# Patient Record
Sex: MALE | Race: OTHER | NOT HISPANIC OR LATINO | Employment: OTHER | URBAN - METROPOLITAN AREA
[De-identification: names, ages, dates, MRNs, and addresses within clinical notes are randomized per-mention and may not be internally consistent; named-entity substitution may affect disease eponyms.]

---

## 2017-01-26 ENCOUNTER — OFFICE VISIT (OUTPATIENT)
Dept: OPTOMETRY | Facility: CLINIC | Age: 62
End: 2017-01-26
Payer: COMMERCIAL

## 2017-01-26 DIAGNOSIS — H43.811 POSTERIOR VITREOUS DETACHMENT, RIGHT: Primary | ICD-10-CM

## 2017-01-26 PROCEDURE — 99999 PR PBB SHADOW E&M-EST. PATIENT-LVL II: CPT | Mod: PBBFAC,,, | Performed by: OPTOMETRIST

## 2017-01-26 PROCEDURE — 92014 COMPRE OPH EXAM EST PT 1/>: CPT | Mod: S$GLB,,, | Performed by: OPTOMETRIST

## 2017-01-26 NOTE — PROGRESS NOTES
HPI     Eye Problem    Additional comments: Flashes od           Comments   62 year old male   Complains of light flashes in the peripheral of his right eye for 2 days.   States that they come and go   Denies any floaters or total loss of vision   No eye pain         Last edited by Harvey Gayle, OD on 1/26/2017  9:02 AM. (History)        ROS     Negative for: Constitutional, Gastrointestinal, Neurological, Skin,   Genitourinary, Musculoskeletal, HENT, Endocrine, Cardiovascular, Eyes,   Respiratory, Psychiatric, Allergic/Imm, Heme/Lymph    Last edited by Harvey Gayle, OD on 1/26/2017  9:02 AM. (History)        Assessment /Plan     For exam results, see Encounter Report.    Posterior vitreous detachment, right      1. No evidence of holes, tears or detachment of retina. Negative Shafers sign in vitreous. 90 diopter lens exam negative in all quadrants. Patient educated to signs and symptoms of retinal detachment and return to clinic immediately if signs or symptoms arise. RTC 1 month dilated eye exam.

## 2017-02-23 ENCOUNTER — OFFICE VISIT (OUTPATIENT)
Dept: OPTOMETRY | Facility: CLINIC | Age: 62
End: 2017-02-23
Payer: COMMERCIAL

## 2017-02-23 DIAGNOSIS — H43.811 POSTERIOR VITREOUS DETACHMENT, RIGHT: Primary | ICD-10-CM

## 2017-02-23 PROCEDURE — 99999 PR PBB SHADOW E&M-EST. PATIENT-LVL II: CPT | Mod: PBBFAC,,, | Performed by: OPTOMETRIST

## 2017-02-23 PROCEDURE — 92014 COMPRE OPH EXAM EST PT 1/>: CPT | Mod: S$GLB,,, | Performed by: OPTOMETRIST

## 2017-02-23 NOTE — PROGRESS NOTES
HPI     Spots and/or Floaters    Additional comments: PVD Check right eye           Comments   Patient here for 1 month PVD check OD, states flashes do not occur as   often as they did at initial presentation. No changes in vision and no   increase in floaters.     (-) Pain, discomfort   (+) Flashes (Decreased)   (+) Floaters stable        Last edited by Harvey Gayle, OD on 2/23/2017  9:09 AM. (History)        ROS     Negative for: Constitutional, Gastrointestinal, Neurological, Skin,   Genitourinary, Musculoskeletal, HENT, Endocrine, Cardiovascular, Eyes,   Respiratory, Psychiatric, Allergic/Imm, Heme/Lymph    Last edited by Harvey Gayle, OD on 2/23/2017  9:06 AM. (History)        Assessment /Plan     For exam results, see Encounter Report.    Posterior vitreous detachment, right      1. No evidence of holes, tears or detachment of retina. Negative Shafers sign in vitreous. 90 diopter lens exam negative in all quadrants. Patient educated to signs and symptoms of retinal detachment and return to clinic immediately if signs or symptoms arise.

## 2017-04-27 ENCOUNTER — OFFICE VISIT (OUTPATIENT)
Dept: OPTOMETRY | Facility: CLINIC | Age: 62
End: 2017-04-27
Payer: COMMERCIAL

## 2017-04-27 DIAGNOSIS — H52.203 MYOPIA WITH ASTIGMATISM AND PRESBYOPIA, BILATERAL: ICD-10-CM

## 2017-04-27 DIAGNOSIS — H43.811 POSTERIOR VITREOUS DETACHMENT, RIGHT: Primary | ICD-10-CM

## 2017-04-27 DIAGNOSIS — H52.4 MYOPIA WITH ASTIGMATISM AND PRESBYOPIA, BILATERAL: ICD-10-CM

## 2017-04-27 DIAGNOSIS — H52.13 MYOPIA WITH ASTIGMATISM AND PRESBYOPIA, BILATERAL: ICD-10-CM

## 2017-04-27 PROCEDURE — 92012 INTRM OPH EXAM EST PATIENT: CPT | Mod: S$GLB,,, | Performed by: OPTOMETRIST

## 2017-04-27 PROCEDURE — 92015 DETERMINE REFRACTIVE STATE: CPT | Mod: S$GLB,,, | Performed by: OPTOMETRIST

## 2017-04-27 PROCEDURE — 99999 PR PBB SHADOW E&M-EST. PATIENT-LVL II: CPT | Mod: PBBFAC,,, | Performed by: OPTOMETRIST

## 2017-04-27 NOTE — PROGRESS NOTES
HPI     Spots and/or Floaters    Additional comments: OD           Comments   Flashes have decreased to couple times per week   Pt prefers not be dilated if possible.  No vision changes  Wants glasses check       Last edited by Harvey Gayle, OD on 4/27/2017  8:41 AM. (History)        ROS     Negative for: Constitutional, Gastrointestinal, Neurological, Skin,   Genitourinary, Musculoskeletal, HENT, Endocrine, Cardiovascular, Eyes,   Respiratory, Psychiatric, Allergic/Imm, Heme/Lymph    Last edited by Harvey Gayle, OD on 4/27/2017  8:41 AM. (History)        Assessment /Plan     For exam results, see Encounter Report.    Posterior vitreous detachment, right    Myopia with astigmatism and presbyopia, bilateral      1. Symptoms less, No evidence of holes, tears or detachment of retina. Negative Shafers sign in vitreous. 90 diopter lens exam negative in all quadrants. Patient educated to signs and symptoms of retinal detachment and return to clinic immediately if signs or symptoms arise. Pt chose to defer dilation.   2. Spec Rx given. Different lens options discussed with patient. RTC 1 year full exam.

## 2017-05-05 RX ORDER — PRAVASTATIN SODIUM 20 MG/1
TABLET ORAL
Qty: 12 TABLET | Refills: 2 | Status: SHIPPED | OUTPATIENT
Start: 2017-05-05 | End: 2017-06-09 | Stop reason: DRUGHIGH

## 2017-06-03 ENCOUNTER — PATIENT MESSAGE (OUTPATIENT)
Dept: INTERNAL MEDICINE | Facility: CLINIC | Age: 62
End: 2017-06-03

## 2017-06-03 DIAGNOSIS — Z00.00 ROUTINE GENERAL MEDICAL EXAMINATION AT A HEALTH CARE FACILITY: Primary | ICD-10-CM

## 2017-06-03 DIAGNOSIS — Z12.5 SCREENING PSA (PROSTATE SPECIFIC ANTIGEN): ICD-10-CM

## 2017-06-07 ENCOUNTER — LAB VISIT (OUTPATIENT)
Dept: LAB | Facility: HOSPITAL | Age: 62
End: 2017-06-07
Attending: INTERNAL MEDICINE
Payer: COMMERCIAL

## 2017-06-07 DIAGNOSIS — Z11.59 NEED FOR HEPATITIS C SCREENING TEST: ICD-10-CM

## 2017-06-07 DIAGNOSIS — Z00.00 ROUTINE GENERAL MEDICAL EXAMINATION AT A HEALTH CARE FACILITY: ICD-10-CM

## 2017-06-07 DIAGNOSIS — Z12.5 SCREENING PSA (PROSTATE SPECIFIC ANTIGEN): ICD-10-CM

## 2017-06-07 LAB
ALBUMIN SERPL BCP-MCNC: 3.8 G/DL
ALP SERPL-CCNC: 73 U/L
ALT SERPL W/O P-5'-P-CCNC: 35 U/L
ANION GAP SERPL CALC-SCNC: 9 MMOL/L
AST SERPL-CCNC: 23 U/L
BASOPHILS # BLD AUTO: 0.05 K/UL
BASOPHILS NFR BLD: 0.6 %
BILIRUB SERPL-MCNC: 0.4 MG/DL
BUN SERPL-MCNC: 14 MG/DL
CALCIUM SERPL-MCNC: 9.3 MG/DL
CHLORIDE SERPL-SCNC: 109 MMOL/L
CHOLEST/HDLC SERPL: 4.6 {RATIO}
CO2 SERPL-SCNC: 22 MMOL/L
COMPLEXED PSA SERPL-MCNC: 0.24 NG/ML
CREAT SERPL-MCNC: 0.9 MG/DL
DIFFERENTIAL METHOD: ABNORMAL
EOSINOPHIL # BLD AUTO: 0.3 K/UL
EOSINOPHIL NFR BLD: 3.6 %
ERYTHROCYTE [DISTWIDTH] IN BLOOD BY AUTOMATED COUNT: 13.9 %
EST. GFR  (AFRICAN AMERICAN): >60 ML/MIN/1.73 M^2
EST. GFR  (NON AFRICAN AMERICAN): >60 ML/MIN/1.73 M^2
GLUCOSE SERPL-MCNC: 105 MG/DL
HCT VFR BLD AUTO: 45.3 %
HCV AB SERPL QL IA: NEGATIVE
HDL/CHOLESTEROL RATIO: 21.8 %
HDLC SERPL-MCNC: 179 MG/DL
HDLC SERPL-MCNC: 39 MG/DL
HGB BLD-MCNC: 15.3 G/DL
LDLC SERPL CALC-MCNC: 118.8 MG/DL
LYMPHOCYTES # BLD AUTO: 2.3 K/UL
LYMPHOCYTES NFR BLD: 29 %
MCH RBC QN AUTO: 29.9 PG
MCHC RBC AUTO-ENTMCNC: 33.8 %
MCV RBC AUTO: 89 FL
MONOCYTES # BLD AUTO: 0.7 K/UL
MONOCYTES NFR BLD: 9.2 %
NEUTROPHILS # BLD AUTO: 4.5 K/UL
NEUTROPHILS NFR BLD: 57.5 %
NONHDLC SERPL-MCNC: 140 MG/DL
PLATELET # BLD AUTO: 292 K/UL
PMV BLD AUTO: 9 FL
POTASSIUM SERPL-SCNC: 4.6 MMOL/L
PROT SERPL-MCNC: 6.8 G/DL
RBC # BLD AUTO: 5.12 M/UL
SODIUM SERPL-SCNC: 140 MMOL/L
TRIGL SERPL-MCNC: 106 MG/DL
TSH SERPL DL<=0.005 MIU/L-ACNC: 2.1 UIU/ML
WBC # BLD AUTO: 7.83 K/UL

## 2017-06-07 PROCEDURE — 80061 LIPID PANEL: CPT

## 2017-06-07 PROCEDURE — 80053 COMPREHEN METABOLIC PANEL: CPT

## 2017-06-07 PROCEDURE — 36415 COLL VENOUS BLD VENIPUNCTURE: CPT | Mod: PO

## 2017-06-07 PROCEDURE — 84443 ASSAY THYROID STIM HORMONE: CPT

## 2017-06-07 PROCEDURE — 84153 ASSAY OF PSA TOTAL: CPT

## 2017-06-07 PROCEDURE — 85025 COMPLETE CBC W/AUTO DIFF WBC: CPT

## 2017-06-07 PROCEDURE — 86803 HEPATITIS C AB TEST: CPT

## 2017-06-09 ENCOUNTER — OFFICE VISIT (OUTPATIENT)
Dept: INTERNAL MEDICINE | Facility: CLINIC | Age: 62
End: 2017-06-09
Payer: COMMERCIAL

## 2017-06-09 VITALS
TEMPERATURE: 99 F | DIASTOLIC BLOOD PRESSURE: 64 MMHG | SYSTOLIC BLOOD PRESSURE: 100 MMHG | BODY MASS INDEX: 25.16 KG/M2 | WEIGHT: 189.81 LBS | HEART RATE: 68 BPM | HEIGHT: 73 IN

## 2017-06-09 DIAGNOSIS — Z00.00 ENCOUNTER FOR PREVENTIVE HEALTH EXAMINATION: Primary | ICD-10-CM

## 2017-06-09 DIAGNOSIS — E78.5 DYSLIPIDEMIA: ICD-10-CM

## 2017-06-09 PROCEDURE — 99999 PR PBB SHADOW E&M-EST. PATIENT-LVL III: CPT | Mod: PBBFAC,,, | Performed by: INTERNAL MEDICINE

## 2017-06-09 PROCEDURE — 99396 PREV VISIT EST AGE 40-64: CPT | Mod: S$GLB,,, | Performed by: INTERNAL MEDICINE

## 2017-06-09 RX ORDER — PRAVASTATIN SODIUM 20 MG/1
20 TABLET ORAL NIGHTLY
Qty: 90 TABLET | Refills: 3 | Status: SHIPPED | OUTPATIENT
Start: 2017-06-09 | End: 2017-12-14

## 2017-06-09 NOTE — PROGRESS NOTES
History of present illness:  62-year-old male in today for general health assessment.    Current medication:  Pravastatin 20 mg on Monday was a Friday.    Review of systems:  General: no fever, chills, generalized body aches. No unexpected weight loss.  Eyes:  No visual disturbances.  HEENT:  No hoarseness, dysphagia, ear pain.  Respiratory:  No cough, no shortness of breath.  Cardiovascular: no chest pain, palpitations, cough, exertional limb pain. No edema.  GI: no nausea, vomiting.  No abdominal pain. No change in bowel habits.  No melena, no hematochezia.  : no dysuria. No change in the color or character of the urine. No urinary frequency.  Musculoskeletal: He has seen an outside orthopedics for knee osteoarthritis.  This is stable at present.  Neurologic:  No focal neurological complaints.  No headaches.  Skin:  No rashes or other concerns.  Psych:  No emotional issues    Past medical history:  Dyslipidemia  Obstructive sleep apnea not intolerant to CPAP.    Past surgical history, family medical history, and social histories are noted and reviewed in the electronic medical record history sections.    Health screenings:  He has not yet had a colonoscopy.  He is receptive to such and will contact us when he is ready to proceed.  Eye exams are up-to-date.    Physical examination:  GENERAL:  Alert, appropriately groomed, no acute distress.  VS:  EYES: sclerae white ,nonicteric. PERRL. some ptosis of the left lid, chronic issue  HEENT:  Normocephalic. Ear canals and tympanic membranes normal. Mouth and pharynx normal. No thyromegaly. Trachea midline and freely mobile.  LUNGS:  Clear to ascultation and normal to percussion.  CARDIOVASCULAR:  Normal heart sounds.  No significant murmur. Carotids full bilaterally without bruit.  Pedal pulses intact .  No abdominal bruit.  No peripheral extremity edema.  GI: the abdomen is soft, no distension. No masses , tenderness, organomegaly.  Rectal examination normal.  :  scrotum, testicles and penis normal. Prostate without nodules or asymmetry.  LYMPHATIC:  No axillary, inguinal , cervical adenopathy.  MUSCULOSKELETAL:  Range of motion, stability and strength of the right and left upper and lower extremities normal. No swollen or tender joints  NEUROLOGIC:  DTR's normal. No gross motor or sensory deficits apparent, gait normal.  SKIN:  No rashes.   MS:  Alert, oriented , affect and mood all appropriate    Data:  Lab data noted and reviewed from June 7, 2017.  All reasonable.  Lipids are probably not quite optimal.    Impression:  Generally healthy 62-year-old male living healthy lifestyle with some recent improvement with regards to diet.  Dyslipidemia on low-dose statin therapy at least a candidate for moderate to high intensity statin therapy.  There is a question of this some joint issues with moderate dose pravastatin in the past but it is not clear.  Structure sleep apnea intolerant to CPAP.    Plan:  Discussed trying to titrate upward on his statin therapy and he will begin taking pravastatin 20 mg daily and observe.  Chemistry profile lipid profile in 3 months.  He will call us when he is ready to schedule colonoscopy.

## 2017-09-08 ENCOUNTER — LAB VISIT (OUTPATIENT)
Dept: LAB | Facility: HOSPITAL | Age: 62
End: 2017-09-08
Attending: INTERNAL MEDICINE
Payer: COMMERCIAL

## 2017-09-08 DIAGNOSIS — E78.5 DYSLIPIDEMIA: ICD-10-CM

## 2017-09-08 LAB
ALBUMIN SERPL BCP-MCNC: 3.6 G/DL
ALP SERPL-CCNC: 87 U/L
ALT SERPL W/O P-5'-P-CCNC: 30 U/L
ANION GAP SERPL CALC-SCNC: 9 MMOL/L
AST SERPL-CCNC: 16 U/L
BILIRUB SERPL-MCNC: 0.3 MG/DL
BUN SERPL-MCNC: 19 MG/DL
CALCIUM SERPL-MCNC: 9.2 MG/DL
CHLORIDE SERPL-SCNC: 106 MMOL/L
CHOLEST SERPL-MCNC: 162 MG/DL
CHOLEST/HDLC SERPL: 3.5 {RATIO}
CO2 SERPL-SCNC: 24 MMOL/L
CREAT SERPL-MCNC: 0.5 MG/DL
EST. GFR  (AFRICAN AMERICAN): >60 ML/MIN/1.73 M^2
EST. GFR  (NON AFRICAN AMERICAN): >60 ML/MIN/1.73 M^2
GLUCOSE SERPL-MCNC: 102 MG/DL
HDLC SERPL-MCNC: 46 MG/DL
HDLC SERPL: 28.4 %
LDLC SERPL CALC-MCNC: 102.2 MG/DL
NONHDLC SERPL-MCNC: 116 MG/DL
POTASSIUM SERPL-SCNC: 4.5 MMOL/L
PROT SERPL-MCNC: 7 G/DL
SODIUM SERPL-SCNC: 139 MMOL/L
TRIGL SERPL-MCNC: 69 MG/DL

## 2017-09-08 PROCEDURE — 36415 COLL VENOUS BLD VENIPUNCTURE: CPT | Mod: PO

## 2017-09-08 PROCEDURE — 80061 LIPID PANEL: CPT

## 2017-09-08 PROCEDURE — 80053 COMPREHEN METABOLIC PANEL: CPT

## 2017-10-17 ENCOUNTER — HOSPITAL ENCOUNTER (OUTPATIENT)
Dept: RADIOLOGY | Facility: HOSPITAL | Age: 62
Discharge: HOME | End: 2017-10-17
Attending: INTERNAL MEDICINE
Payer: COMMERCIAL

## 2017-10-17 ENCOUNTER — OFFICE VISIT (OUTPATIENT)
Dept: INTERNAL MEDICINE | Facility: CLINIC | Age: 62
End: 2017-10-17
Payer: COMMERCIAL

## 2017-10-17 VITALS
RESPIRATION RATE: 20 BRPM | SYSTOLIC BLOOD PRESSURE: 138 MMHG | HEART RATE: 70 BPM | TEMPERATURE: 99 F | DIASTOLIC BLOOD PRESSURE: 82 MMHG | BODY MASS INDEX: 24.63 KG/M2 | HEIGHT: 73 IN | WEIGHT: 185.88 LBS

## 2017-10-17 DIAGNOSIS — Z12.11 COLON CANCER SCREENING: ICD-10-CM

## 2017-10-17 DIAGNOSIS — R10.11 ABDOMINAL PAIN, RUQ: Primary | ICD-10-CM

## 2017-10-17 DIAGNOSIS — R10.11 ABDOMINAL PAIN, RUQ: ICD-10-CM

## 2017-10-17 PROCEDURE — 99999 PR PBB SHADOW E&M-EST. PATIENT-LVL III: CPT | Mod: PBBFAC,,, | Performed by: INTERNAL MEDICINE

## 2017-10-17 PROCEDURE — 71020 XR CHEST PA AND LATERAL: CPT | Mod: TC,PO

## 2017-10-17 PROCEDURE — 99214 OFFICE O/P EST MOD 30 MIN: CPT | Mod: S$GLB,,, | Performed by: INTERNAL MEDICINE

## 2017-10-17 PROCEDURE — 71020 XR CHEST PA AND LATERAL: CPT | Mod: 26,,, | Performed by: RADIOLOGY

## 2017-10-17 NOTE — PROGRESS NOTES
This office note has been dictated.  HISTORY OF PRESENT ILLNESS:  This is a 62-year-old gentleman in today with a   complaint of about a month of some right upper quadrant discomfort, quite   ill-defined, mild, tends to happen mostly in the evening, does seem to be   occurring after meals, but it is not associated with any nausea, vomiting, GERD   symptoms or other, not associated with any bowel changes.  No radiation, not   related to movement or physical activity.  He does report playing more tennis   recently than he normally does.  No previous history of similar pain.  No weight   loss or weight gain.    CURRENT MEDICATIONS:  Noted and reviewed in the electronic medical record   medication list.    REVIEW OF SYSTEMS:  CONSTITUTIONAL:  No fever, no chills, no generalized body aches.  CARDIOVASCULAR:  No chest pain, palpitations or syncope.  RESPIRATORY:  No cough or shortness of breath.  GENITOURINARY:  No dysuria, frequency, change in color, or character of urine.  SKIN:  No rashes.    PAST MEDICAL HISTORY, PAST SURGICAL HISTORY, FAMILY AND SOCIAL HISTORY:  All   noted and reviewed in the electronic medical record history sections.    PHYSICAL EXAMINATION:  GENERAL:  Alert, pleasant, appropriately groomed male in no acute distress.  VITAL SIGNS:  All noted and reviewed.  EYES:  Sclerae white, nonicteric.  NECK:  Supple.  No mass, no thyromegaly.  LUNGS:  Clear to auscultation.   CARDIOVASCULAR:  Regular rate and rhythm.  There is no significant murmur.    Carotids are full bilaterally without bruits.  GASTROINTESTINAL:  The abdomen is nondistended, soft, benign.  No masses, no   tenderness, no organomegaly.  There is no CVA tenderness.    IMPRESSION:  Ill-defined intermittent right upper quadrant discomfort, possibly   muscular, rule out other issues.    PLAN:  1.  He is noted to have had a blood work last month, all reasonable including   chemistry profile and urinalysis.  2.  Abdominal ultrasound.  3.  Chest  x-ray.  4.  He is agreeable to go ahead and get the colonoscopy done that is ordered.      PB/IN  dd: 10/17/2017 11:59:53 (CDT)  td: 10/18/2017 04:50:15 (CDT)  Doc ID   #[id number]  Job ID #[id number]    CC:

## 2017-10-19 ENCOUNTER — PATIENT MESSAGE (OUTPATIENT)
Dept: INTERNAL MEDICINE | Facility: CLINIC | Age: 62
End: 2017-10-19

## 2017-10-19 DIAGNOSIS — R93.89 ABNORMAL CXR: Primary | ICD-10-CM

## 2017-10-19 NOTE — TELEPHONE ENCOUNTER
JOYCE message sent to the pt to advise of MD response to xray results and to advise that CT has been scheduled on 10/23/17 to follow his u/s.

## 2017-10-23 ENCOUNTER — HOSPITAL ENCOUNTER (OUTPATIENT)
Dept: RADIOLOGY | Facility: HOSPITAL | Age: 62
Discharge: HOME | End: 2017-10-23
Attending: INTERNAL MEDICINE
Payer: COMMERCIAL

## 2017-10-23 DIAGNOSIS — R10.11 ABDOMINAL PAIN, RUQ: ICD-10-CM

## 2017-10-23 DIAGNOSIS — R93.89 ABNORMAL CXR: ICD-10-CM

## 2017-10-23 PROCEDURE — 76700 US EXAM ABDOM COMPLETE: CPT | Mod: TC

## 2017-10-23 PROCEDURE — 76700 US EXAM ABDOM COMPLETE: CPT | Mod: 26,,, | Performed by: RADIOLOGY

## 2017-10-23 PROCEDURE — 71250 CT THORAX DX C-: CPT | Mod: 26,,, | Performed by: RADIOLOGY

## 2017-10-23 PROCEDURE — 71250 CT THORAX DX C-: CPT | Mod: TC

## 2017-10-24 ENCOUNTER — PATIENT MESSAGE (OUTPATIENT)
Dept: INTERNAL MEDICINE | Facility: CLINIC | Age: 62
End: 2017-10-24

## 2017-10-24 DIAGNOSIS — J84.112 UIP (USUAL INTERSTITIAL PNEUMONITIS): Primary | ICD-10-CM

## 2017-10-25 ENCOUNTER — TELEPHONE (OUTPATIENT)
Dept: INTERNAL MEDICINE | Facility: CLINIC | Age: 62
End: 2017-10-25

## 2017-10-25 NOTE — TELEPHONE ENCOUNTER
Spoke with pt who advises that his results arrived on the pt portal last night and he was able to review the results as well.

## 2017-10-25 NOTE — TELEPHONE ENCOUNTER
----- Message from Sandra West sent at 10/24/2017  4:13 PM CDT -----  Contact: self   Patient would like to get test results.  Name of test (lab, mammo, etc.):  CT-Scan   Date of test:  10/23  Ordering provider: Dr. Aguiar   Where was the test performed:  [facility] CT SCAN  Comments:

## 2017-10-27 ENCOUNTER — HOSPITAL ENCOUNTER (OUTPATIENT)
Dept: PULMONOLOGY | Facility: CLINIC | Age: 62
Discharge: HOME | End: 2017-10-27
Payer: COMMERCIAL

## 2017-10-27 ENCOUNTER — OFFICE VISIT (OUTPATIENT)
Dept: PULMONOLOGY | Facility: CLINIC | Age: 62
End: 2017-10-27
Payer: COMMERCIAL

## 2017-10-27 VITALS
SYSTOLIC BLOOD PRESSURE: 136 MMHG | HEIGHT: 73 IN | DIASTOLIC BLOOD PRESSURE: 72 MMHG | WEIGHT: 189.63 LBS | BODY MASS INDEX: 25.13 KG/M2 | HEART RATE: 66 BPM | OXYGEN SATURATION: 98 %

## 2017-10-27 VITALS — HEIGHT: 71 IN | BODY MASS INDEX: 26.55 KG/M2 | WEIGHT: 189.63 LBS

## 2017-10-27 DIAGNOSIS — R91.8 ABNORMAL CT SCAN OF LUNG: ICD-10-CM

## 2017-10-27 DIAGNOSIS — J43.9 PULMONARY EMPHYSEMA, UNSPECIFIED EMPHYSEMA TYPE: ICD-10-CM

## 2017-10-27 DIAGNOSIS — R91.8 ABNORMAL CT SCAN OF LUNG: Primary | ICD-10-CM

## 2017-10-27 DIAGNOSIS — F17.201 TOBACCO DEPENDENCE IN REMISSION: ICD-10-CM

## 2017-10-27 PROBLEM — R93.89 ABNORMAL CT OF THE CHEST: Status: ACTIVE | Noted: 2017-10-27

## 2017-10-27 LAB
POST FEV1 FVC: 0.68
POST FEV1: 3.43
POST FVC: 5.08
PRE FEV1 FVC: 67
PRE FEV1: 3.41
PRE FVC: 5.07
PREDICTED FEV1 FVC: 79
PREDICTED FEV1: 3.65
PREDICTED FVC: 4.55

## 2017-10-27 PROCEDURE — 99999 PR PBB SHADOW E&M-EST. PATIENT-LVL III: CPT | Mod: PBBFAC,,, | Performed by: HOSPITALIST

## 2017-10-27 PROCEDURE — 94620 PR PULMONARY STRESS TESTING,SIMPLE: CPT | Mod: S$GLB,,, | Performed by: INTERNAL MEDICINE

## 2017-10-27 PROCEDURE — 99203 OFFICE O/P NEW LOW 30 MIN: CPT | Mod: 25,S$GLB,, | Performed by: HOSPITALIST

## 2017-10-27 PROCEDURE — 94060 EVALUATION OF WHEEZING: CPT | Mod: 59,S$GLB,, | Performed by: INTERNAL MEDICINE

## 2017-10-27 PROCEDURE — 94727 GAS DIL/WSHOT DETER LNG VOL: CPT | Mod: 51,S$GLB,, | Performed by: INTERNAL MEDICINE

## 2017-10-27 PROCEDURE — 94729 DIFFUSING CAPACITY: CPT | Mod: S$GLB,,, | Performed by: INTERNAL MEDICINE

## 2017-10-27 NOTE — PROGRESS NOTES
Subjective:       Patient ID: Esteban Carson is a 62 y.o. male.    Chief Complaint: Abnormal Ct Scan and Pulmonary Fibrosis    62-yo male presents on referral from PCP for abnormal CT scan. Pt is generally healthy and exercises 4 days a week. He played two hours of tennis yesterday and credits tennis and healthy diet for a 30-lbs weight loss this year. Last week he had some pain in the right flank of the abdomen and visited his PCP. US abdomen was unremarkable, but CXR was noted to have bibasilar reticular opacities and CT chest was recommended. This was read as showing emphysema, reticular opacities, and honeycombing. Pt was then referred here for further evaluation. His is accompanied by his wife. He says he has no h/o lung or rheumatologic disease and has no respiratory symptoms eg SOB, REED, cough. He does become congested in the sinuses when he mows his lawn, but says this is quickly relieved with Flonase prn. Pt smoked a half-pack of cigarettes daily for about 20 years until his late 30s, then occasionally and intermittently until 2014. Estimated pack years: 10-15. He and his wife have lived near the [place] for 11 years and say the industrial plants there warn of dangerous chemical pollutants, but he is not aware of specific exposures. He denies h/o occupational exposures but says he knows there was asbestos in his childhood home and school and is certain he was exposed. He has no complaints otherwise.       Abnormal Ct Scan   Associated symptoms include abdominal pain (as described above) and congestion. Pertinent negatives include no arthralgias, chest pain, coughing, fatigue, fever, headaches, nausea, rash or sore throat.   Pulmonary Fibrosis   Associated symptoms include abdominal pain (as described above) and congestion. Pertinent negatives include no arthralgias, chest pain, coughing, fatigue, fever, headaches, nausea, rash or sore throat.     Review of Systems   Constitutional: Positive for  weight loss (30 lbs intentionally through D&E). Negative for fever, activity change, appetite change, fatigue, night sweats and weakness.   HENT: Positive for sinus pressure and congestion. Negative for postnasal drip, rhinorrhea and sore throat.    Eyes: Negative for redness and itching.   Respiratory: Negative for apnea, snoring, cough, hemoptysis, sputum production, shortness of breath, wheezing, orthopnea, asthma nighttime symptoms, pleurisy, dyspnea on extertion, somnolence and Paroxysmal Nocturnal Dyspnea.    Cardiovascular: Negative for chest pain, palpitations and leg swelling.   Musculoskeletal: Negative for arthralgias and back pain.   Skin: Negative for rash.   Gastrointestinal: Positive for abdominal pain (as described above). Negative for nausea and acid reflux.   Neurological: Negative for dizziness, syncope and headaches.   Hematological: Negative for adenopathy. Does not bruise/bleed easily and no excessive bruising.   Psychiatric/Behavioral: Negative for confusion and sleep disturbance. The patient is not nervous/anxious.        Objective:       Vitals:    10/27/17 1347   BP: 136/72   Pulse: 66       Physical Exam   Constitutional: He is oriented to person, place, and time. He appears well-developed and well-nourished. No distress. He is not obese.   HENT:   Head: Normocephalic.   Right Ear: External ear normal.   Left Ear: External ear normal.   Nose: Nose normal. No mucosal edema.   Mouth/Throat: Oropharynx is clear and moist. No oropharyngeal exudate. Mallampati Score: IV.   Neck: Normal range of motion. Neck supple. No JVD present. No tracheal deviation present. No thyromegaly present.   Cardiovascular: Normal rate, regular rhythm, normal heart sounds and intact distal pulses.  Exam reveals no gallop and no friction rub.    No murmur heard.  Pulmonary/Chest: Normal expansion, symmetric chest wall expansion and effort normal. He has no decreased breath sounds. He has no wheezes. He has no  rhonchi. He has rales. He exhibits no tenderness. Negative for egophony.   Abdominal: Soft. Bowel sounds are normal. He exhibits no distension and no mass. There is no tenderness.   Musculoskeletal: He exhibits no edema, tenderness or deformity.   Lymphadenopathy: No supraclavicular adenopathy is present.     He has no cervical adenopathy.   Neurological: He is alert and oriented to person, place, and time. He exhibits normal muscle tone. Gait normal.   Skin: Skin is warm and dry. No rash noted. He is not diaphoretic. No cyanosis or erythema. Nails show no clubbing.   Psychiatric: He has a normal mood and affect. His behavior is normal. Judgment and thought content normal.               Assessment / Plan       1. Abnormal CT scan of lung    2. Ground glass opacity present on imaging of lung    3. Pulmonary emphysema, unspecified emphysema type        Outpatient Encounter Prescriptions as of 10/27/2017   Medication Sig Dispense Refill    ascorbic acid (VITAMIN C) 100 MG tablet Take 100 mg by mouth once daily.      aspirin (ECOTRIN) 81 MG EC tablet Take 81 mg by mouth once daily.      ergocalciferol (VITAMIN D2) 50,000 unit Cap Take 50,000 Units by mouth every 7 days.      fluticasone (FLONASE) 50 mcg/actuation nasal spray 2 sprays by Each Nare route once daily. 16 g 11    ibuprofen (ADVIL,MOTRIN) 200 MG tablet Take 200 mg by mouth every 6 (six) hours as needed.      multivitamin capsule Take 1 capsule by mouth once daily.      pravastatin (PRAVACHOL) 20 MG tablet Take 1 tablet (20 mg total) by mouth every evening. 90 tablet 3     No facility-administered encounter medications on file as of 10/27/2017.      Orders Placed This Encounter   Procedures    Pulmonary stress test     Standing Status:   Future     Standing Expiration Date:   10/27/2018    2D echo with color flow doppler     Standing Status:   Future     Standing Expiration Date:   10/27/2018    Spirometry with/without bronchodilator     Standing  Status:   Future     Standing Expiration Date:   10/27/2018    LUNG VOLUMES     Standing Status:   Future     Standing Expiration Date:   10/27/2018    DLCO-Carbon Monoxide Diffusing Capacity     Standing Status:   Future     Standing Expiration Date:   10/27/2018       Despite his lack of symptoms, findings on exam and imaging are concerning for COPD at a minimum and quite likely ILD. ILD differential at this time remains broad and would include UIP (eg IPF), NSIP, chronic hypersensitivity pneumonitis, other. These are concerning findings and warrant jacobsen investigation.     Plan (addendum)  No meds changes today as he has no symptoms to relieve.   6MWT - normal   Full PFTs - performed in lab are remarkable for mild obstruction, likely small airways disease, no restriction, mildly impaired diffusion.   Echo - next week.   Referral to Lung Transplant - early consultation for ILD that could possibly progress quickly. If a transplant ever enters the picture, his current health is very good.     Pt seen and examined and plan discussed with Dr. Del Real, staff.     RTC TBD following Transplant appt    Hussein Freitas, PGY-5  Pulmonary & Critical Care Medicine  pager [phone]

## 2017-10-27 NOTE — PROCEDURES
Esteban Carson is a 62 y.o.  male patient, who presents for a 6 minute walk test ordered by Hussein Freitas MD.  The diagnosis is Pulmonary Fibrosis; Abnormal Chest Radiograph.  The patient's BMI is 26.5 kg/m2.  Predicted distance (lower limit of normal) is 401.12 meters.      Test Results:    The test was completed without stopping.  The total time walked was 360 seconds.  During walking, the patient reported:  No complaints.  The patient used no assistive devices during testing.     10/27/2017---------Distance: 441.96 meters (1450 feet)     O2 Sat % Supplemental Oxygen Heart Rate Blood Pressure Binh Scale   Pre-exercise  (Resting) 99 % Room Air 69 bpm 176/88 mmHg 0   During Exercise 96 % Room Air 102 bpm 199/91 mmHg 0   Post-exercise  (Recovery) 99 % Room Air  77 bpm 172/90 mmHg      Recovery Time:  135 seconds    Performing nurse/tech:  SAPPHIRE Frederick      PREVIOUS STUDY:   The patient has not had a previous study.      CLINICAL INTERPRETATION:  Six minute walk distance is 441.96 meters (1450 feet) with no dyspnea.  During exercise, there was desaturation while breathing room air.  Both blood pressure and heart rate increased significantly with walking.  Hypertension was present prior to exercise.  The patient did not report non-pulmonary symptoms during exercise.  No previous study performed.  Based upon age and body mass index, exercise capacity is normal.

## 2017-10-27 NOTE — PROGRESS NOTES
63 year old man referred to pulmonary for evaluation of abnormal CT Chest.  History of tobacco abuse disorder, but quit many years ago.  No respiratory symptoms at this time.  Physical exam is notable for velcro like crackles at the right base that corresponds to the CT Chest with stacks of subpleural cysts concerning for UIP.   Plan for PFTs, 6MWT, ECHO for assessment.  Will consider referral to Dr. Acharya for further evaluation.

## 2017-10-30 ENCOUNTER — TELEPHONE (OUTPATIENT)
Dept: TRANSPLANT | Facility: CLINIC | Age: 62
End: 2017-10-30

## 2017-10-30 DIAGNOSIS — J84.9 ILD (INTERSTITIAL LUNG DISEASE): Primary | ICD-10-CM

## 2017-10-30 NOTE — TELEPHONE ENCOUNTER
----- Message from Miranda Costa sent at 10/30/2017 11:37 AM CDT -----  Contact: pt  Calling to speak with someone about status of his referral to for [place] please call him @ # [phone].

## 2017-10-30 NOTE — LETTER
11/02/2017    Mariel Del Real  [address] [facility] [address]  Phone: [phone]  Fax: [phone]         Dear Dr. Mariel Del Real    Patient: Esteban Carson     MR Number: [id number]     YOB: 1955       Thank you for the referral of Esteban Carson to our lung transplant program. An initial appointment with the transplant team has been scheduled for November 2, 2017. You will receive an after-visit summary following the completion of your patients appointment in our clinic.    Thank you again for your trust in our program. If there is anything we can do for you or your staff, please feel free to contact us at [phone].    Sincerely,         Tanmay Acharya MD   Director, Lung Transplantation   Pulmonary & Critical Care Medicine    [facility]  [address] [place] [address] [phone]

## 2017-10-31 ENCOUNTER — OFFICE VISIT (OUTPATIENT)
Dept: OPTOMETRY | Facility: CLINIC | Age: 62
End: 2017-10-31
Payer: COMMERCIAL

## 2017-10-31 ENCOUNTER — CLINICAL SUPPORT (OUTPATIENT)
Dept: CARDIOLOGY | Facility: CLINIC | Age: 62
End: 2017-10-31
Payer: COMMERCIAL

## 2017-10-31 DIAGNOSIS — I34.9 NONRHEUMATIC MITRAL VALVE DISORDER: ICD-10-CM

## 2017-10-31 DIAGNOSIS — H25.13 NUCLEAR SCLEROSIS, BILATERAL: Primary | ICD-10-CM

## 2017-10-31 DIAGNOSIS — H52.203 MYOPIA WITH ASTIGMATISM AND PRESBYOPIA, BILATERAL: ICD-10-CM

## 2017-10-31 DIAGNOSIS — R91.8 ABNORMAL CT SCAN OF LUNG: ICD-10-CM

## 2017-10-31 DIAGNOSIS — H52.4 MYOPIA WITH ASTIGMATISM AND PRESBYOPIA, BILATERAL: ICD-10-CM

## 2017-10-31 DIAGNOSIS — H52.13 MYOPIA WITH ASTIGMATISM AND PRESBYOPIA, BILATERAL: ICD-10-CM

## 2017-10-31 LAB
DIASTOLIC DYSFUNCTION: NO
ESTIMATED PA SYSTOLIC PRESSURE: 35.04
MITRAL VALVE REGURGITATION: NORMAL
RETIRED EF AND QEF - SEE NOTES: 65 (ref 55–65)
TRICUSPID VALVE REGURGITATION: NORMAL

## 2017-10-31 PROCEDURE — 93306 TTE W/DOPPLER COMPLETE: CPT | Mod: NTX,S$GLB,, | Performed by: INTERNAL MEDICINE

## 2017-10-31 PROCEDURE — 99999 PR PBB SHADOW E&M-EST. PATIENT-LVL II: CPT | Mod: PBBFAC,TXP,, | Performed by: OPTOMETRIST

## 2017-10-31 PROCEDURE — 92012 INTRM OPH EXAM EST PATIENT: CPT | Mod: S$GLB,TXP,, | Performed by: OPTOMETRIST

## 2017-10-31 PROCEDURE — 92015 DETERMINE REFRACTIVE STATE: CPT | Mod: S$GLB,TXP,, | Performed by: OPTOMETRIST

## 2017-10-31 NOTE — PROGRESS NOTES
LEIF VELAZQUEZ 04/2017 Plays tennis regularly noticing the past 2-3 months harder to   see the ball.  Driving not as good either.  Glasses are few months old.  Blur ou at dist, x mos, no assoc pain or red, no relief over time,   constant    Last edited by Harvey Gayle, OD on 10/31/2017 10:11 AM. (History)              Assessment /Plan     For exam results, see Encounter Report.    Nuclear sclerosis, bilateral    Myopia with astigmatism and presbyopia, bilateral      1. Causing myopic shift. Educated pt on presence of cataracts and effects on vision. No surgery at this time. Recheck in 6 mos.  2. Spec Rx given. Different lens options discussed with patient.

## 2017-11-01 ENCOUNTER — TELEPHONE (OUTPATIENT)
Dept: TRANSPLANT | Facility: CLINIC | Age: 62
End: 2017-11-01

## 2017-11-01 DIAGNOSIS — J84.112 IPF (IDIOPATHIC PULMONARY FIBROSIS): ICD-10-CM

## 2017-11-01 DIAGNOSIS — Z76.82 LUNG TRANSPLANT CANDIDATE: ICD-10-CM

## 2017-11-01 NOTE — TELEPHONE ENCOUNTER
Contacted patient to state we have clearance for consult. Pt is able to come tomorrow as he does not need testing.  Scheduling card placed on [employer]'s desk.  Informed patient that he will go to the 2nd floor lab to give a urine specimen, and then will see Dr. Acharya in the first floor transplant clinic for 2:00.  Pt verbalized understanding.

## 2017-11-01 NOTE — TELEPHONE ENCOUNTER
Notified patient that once full clearance is obtained, I would call him regarding scheduling.  Still waiting on toxicology and nicotine screen.    ----- Message from Jaqueline Alejandro sent at 11/1/2017  1:34 PM CDT -----  Contact: PT  Would like a call regarding scheduling an appt, he would like a call today.    Please call

## 2017-11-01 NOTE — LETTER
11/01/2017    Mariel Del Real  [address] [facility] [address]  Phone: [phone]  Fax: [phone]         Dear Dr. Mariel Del Real    Patient: Esteban Carson     MR Number: [id number]     YOB: 1955       Thank you for the referral of Esteban Carson to our lung transplant program. An initial appointment with the transplant team has been scheduled for November 2, 2017. You will receive an after-visit summary following the completion of your patients appointment in our clinic.    Thank you again for your trust in our program. If there is anything we can do for you or your staff, please feel free to contact us at [phone].    Sincerely,         Tanmay Acharya MD   Director, Lung Transplantation   Pulmonary & Critical Care Medicine    [facility]  [address] [place] [address] [phone]

## 2017-11-02 ENCOUNTER — INITIAL CONSULT (OUTPATIENT)
Dept: TRANSPLANT | Facility: CLINIC | Age: 62
End: 2017-11-02
Payer: COMMERCIAL

## 2017-11-02 ENCOUNTER — LAB VISIT (OUTPATIENT)
Dept: LAB | Facility: HOSPITAL | Age: 62
End: 2017-11-02
Attending: INTERNAL MEDICINE
Payer: COMMERCIAL

## 2017-11-02 VITALS
HEART RATE: 67 BPM | SYSTOLIC BLOOD PRESSURE: 159 MMHG | HEIGHT: 72 IN | TEMPERATURE: 98 F | DIASTOLIC BLOOD PRESSURE: 79 MMHG | BODY MASS INDEX: 25.6 KG/M2 | OXYGEN SATURATION: 97 % | RESPIRATION RATE: 18 BRPM | WEIGHT: 189 LBS

## 2017-11-02 DIAGNOSIS — Z76.82 LUNG TRANSPLANT CANDIDATE: ICD-10-CM

## 2017-11-02 DIAGNOSIS — J43.2 CENTRILOBULAR EMPHYSEMA: Primary | ICD-10-CM

## 2017-11-02 DIAGNOSIS — J84.112 UIP (USUAL INTERSTITIAL PNEUMONITIS): ICD-10-CM

## 2017-11-02 DIAGNOSIS — J84.112 IPF (IDIOPATHIC PULMONARY FIBROSIS): ICD-10-CM

## 2017-11-02 LAB
AMPHET+METHAMPHET UR QL: NEGATIVE
BARBITURATES UR QL SCN>200 NG/ML: NEGATIVE
BENZODIAZ UR QL SCN>200 NG/ML: NEGATIVE
BZE UR QL SCN: NEGATIVE
CANNABINOIDS UR QL SCN: NEGATIVE
CREAT UR-MCNC: 269 MG/DL
ETHANOL UR-MCNC: <10 MG/DL
METHADONE UR QL SCN>300 NG/ML: NEGATIVE
OPIATES UR QL SCN: NEGATIVE
PCP UR QL SCN>25 NG/ML: NEGATIVE
TOXICOLOGY INFORMATION: NORMAL

## 2017-11-02 PROCEDURE — 99999 PR PBB SHADOW E&M-EST. PATIENT-LVL III: CPT | Mod: PBBFAC,TXP,, | Performed by: INTERNAL MEDICINE

## 2017-11-02 PROCEDURE — 99204 OFFICE O/P NEW MOD 45 MIN: CPT | Mod: 25,NTX,S$GLB, | Performed by: INTERNAL MEDICINE

## 2017-11-02 PROCEDURE — 80307 DRUG TEST PRSMV CHEM ANLYZR: CPT | Mod: TXP

## 2017-11-02 PROCEDURE — 80323 ALKALOIDS NOS: CPT | Mod: TXP

## 2017-11-02 RX ORDER — BACITRACIN 500 UNIT/G
1 OINTMENT (GRAM) TOPICAL DAILY
COMMUNITY

## 2017-11-02 RX ORDER — GUAIFENESIN AND PHENYLEPHRINE HCL 400; 10 MG/1; MG/1
3 TABLET ORAL DAILY
COMMUNITY
End: 2022-11-02

## 2017-11-02 RX ORDER — ACETAMINOPHEN 500 MG
5000 TABLET ORAL DAILY
COMMUNITY

## 2017-11-02 NOTE — LETTER
November 5, 2017        Mariel Del Real  [address]  [facility] [address]  Phone: [phone]  Fax: [phone]             [facility] - [facility] Transplant  [address]  [facility] [address]  Phone: [phone]   Patient: Esteban Carson   MR Number: [id number]   YOB: 1955   Date of Visit: 11/2/2017       Dear Dr. Mariel Del Real    Thank you for referring Esteban Carson to me for evaluation. Attached you will find relevant portions of my assessment and plan of care.    If you have questions, please do not hesitate to call me. I look forward to following Esteban Carson along with you.    Sincerely,    Tanmay Acharya MD    Enclosure    If you would like to receive this communication electronically, please contact [email or web address] or [phone] to request [employer] access.    [employer] is a tool which provides read-only access to select patient information with whom you have a relationship. Its easy to use and provides real time access to review your patients record including encounter summaries, notes, results, and demographic information.    If you feel you have received this communication in error or would no longer like to receive these types of communications, please e-mail [email or web address]

## 2017-11-03 ENCOUNTER — PATIENT MESSAGE (OUTPATIENT)
Dept: TRANSPLANT | Facility: CLINIC | Age: 62
End: 2017-11-03

## 2017-11-06 ENCOUNTER — TELEPHONE (OUTPATIENT)
Dept: PULMONOLOGY | Facility: HOSPITAL | Age: 62
End: 2017-11-06

## 2017-11-06 DIAGNOSIS — R91.8 ABNORMAL CT SCAN, LUNG: Primary | ICD-10-CM

## 2017-11-06 LAB
ANABASINE UR-MCNC: <2 NG/ML
COTININE UR-MCNC: <5 NG/ML
NICOTINE UR-MCNC: <5 NG/ML
NORNICOTINE UR-MCNC: <2 NG/ML

## 2017-11-06 NOTE — PROGRESS NOTES
LUNG TRANSPLANT INITIAL EVALUATION                                                                                                                                             Reason for Visit:  Evaluation for lung transplant    Referring Physician: Mariel Del Real MD    History of Present Illness: Esteban Carson is a 62 y.o. male who is on 0L of oxygen.  He is on no assisted ventilation.  His New York Heart Association Class is I and a Karnofsky score of 100% - Normal, No Complaints, no evidence of disease. He is not diabetic. He presents today for his initial evaluation for lung transplant. He has a diagnosis of combined emphysema and pulmonary fibrosis.     His diagnosis was incidental. He had some abdominal pain earlier in the year and his PCP ordered an abdominal CT and fibrotic changes were seen at the bases. He was referred to Pulmonary Medicine for further evaluation. CT of the chest was performed and showed emphysematous changes as well as fibrotic changes. He has a 25 pack year smoking history and quit in 2014. Denies family history of pulmonary fibrosis.     He denies symptoms of shortness of breath at rest or exertion, cough, or chest pain.     Past Medical History:   Diagnosis Date    Cataract     Emphysema (subcutaneous) (surgical) resulting from a procedure     Hyperlipidemia     ILD (interstitial lung disease)     Osteoarthritis      Colonoscopy Results: No procedure found.    Past Surgical History:   Procedure Laterality Date    FACIAL COSMETIC SURGERY      left ptosis repair      wrist ganglion cyst       Allergies: Patient has no known allergies.    Current Outpatient Prescriptions   Medication Sig    ascorbic acid (VITAMIN C) 100 MG tablet Take 100 mg by mouth once daily.    aspirin (ECOTRIN) 81 MG EC tablet Take 81 mg by mouth once daily.    cholecalciferol, vitamin D3, (VITAMIN D3) 5,000 unit Tab Take 5,000 Units by mouth once daily.    fluticasone (FLONASE) 50 mcg/actuation  nasal spray 2 sprays by Each Nare route once daily. (Patient taking differently: 2 sprays by Each Nare route daily as needed. )    ibuprofen (ADVIL,MOTRIN) 200 MG tablet Take 200 mg by mouth every 6 (six) hours as needed.    KRILL OIL-OMEGA-3-DHA-EPA ORAL Take 1 capsule by mouth once daily.    LACTOBAC NO.41/BIFIDOBACT NO.7 (PROBIOTIC-10 ORAL) Take 1 capsule by mouth once daily.    milk thistle 150 mg Cap Take 1 capsule by mouth once daily.    multivitamin capsule Take 1 capsule by mouth once daily.    pravastatin (PRAVACHOL) 20 MG tablet Take 1 tablet (20 mg total) by mouth every evening.    turmeric root extract 500 mg Cap Take 3 capsules by mouth once daily.     No current facility-administered medications for this visit.        Immunization History   Administered Date(s) Administered    Pneumococcal Conjugate - 13 Valent 04/19/2016    Tdap 01/02/2015     Family History:    Family History   Problem Relation Age of Onset    Heart disease Father     Heart disease Brother     Liver cancer Brother     Macular degeneration Mother     Blindness Mother     Glaucoma Maternal Grandmother     Esophageal cancer Brother      History   Alcohol Use No      History   Drug Use No      Social History     Social History    Marital status:      Spouse name: N/A    Number of children: N/A    Years of education: N/A     Occupational History    Not on file.     Social History Main Topics    Smoking status: Former Smoker     Packs/day: 0.30     Years: 25.00     Types: Cigarettes     Quit date: 8/2/2014    Smokeless tobacco: Never Used    Alcohol use No    Drug use: No    Sexual activity: Yes     Partners: Female     Other Topics Concern    Not on file     Social History Narrative    No narrative on file     Review of Systems   Constitutional: Negative for chills, diaphoresis, fever, malaise/fatigue and weight loss.   HENT: Negative for congestion, ear discharge, ear pain, hearing loss, nosebleeds and  sore throat.    Eyes: Negative for blurred vision, double vision and photophobia.   Respiratory: Negative for cough, hemoptysis, sputum production, shortness of breath and wheezing.    Cardiovascular: Negative for chest pain, palpitations, orthopnea, claudication, leg swelling and PND.   Gastrointestinal: Negative for abdominal pain, blood in stool, constipation, diarrhea, heartburn, melena, nausea and vomiting.   Genitourinary: Negative for dysuria, flank pain, frequency, hematuria and urgency.   Musculoskeletal: Negative for back pain, falls, joint pain, myalgias and neck pain.   Skin: Negative for itching and rash.   Neurological: Negative for dizziness, tremors, sensory change, loss of consciousness, weakness and headaches.   Endo/Heme/Allergies: Does not bruise/bleed easily.   Psychiatric/Behavioral: Negative for depression, hallucinations and memory loss. The patient is not nervous/anxious and does not have insomnia.      Vitals  BP (!) 159/79 (BP Location: Right arm, Patient Position: Sitting, BP Method: Medium (Automatic))   Pulse 67   Temp 98.1 °F (36.7 °C) (Oral)   Resp 18   Ht 6' (1.829 m)   Wt 85.7 kg (189 lb)   SpO2 97% Comment: room  air  BMI 25.63 kg/m²      Physical Exam   Constitutional: He is oriented to person, place, and time and well-developed, well-nourished, and in no distress. No distress.   HENT:   Head: Normocephalic.   Nose: Nose normal.   Mouth/Throat: Oropharynx is clear and moist. No oropharyngeal exudate.   Eyes: Conjunctivae and EOM are normal. Pupils are equal, round, and reactive to light. No scleral icterus.   Neck: Normal range of motion. Neck supple. No JVD present. No tracheal deviation present. No thyromegaly present.   Cardiovascular: Normal rate, regular rhythm and intact distal pulses.  Exam reveals no gallop and no friction rub.    No murmur heard.  Pulmonary/Chest: Effort normal. No stridor. No respiratory distress. He has no wheezes. He has rales in the right lower  field and the left lower field. He exhibits no tenderness.   Abdominal: Soft. Bowel sounds are normal. He exhibits no distension and no mass. There is no tenderness. There is no rebound and no guarding.   Musculoskeletal: Normal range of motion. He exhibits no edema.   Lymphadenopathy:     He has no cervical adenopathy.   Neurological: He is alert and oriented to person, place, and time. No cranial nerve deficit. Gait normal. Coordination normal.   Skin: Skin is warm and dry. No rash noted. He is not diaphoretic. No erythema. No pallor.   Psychiatric: Mood and affect normal.       Labs:  Lab Visit on 11/02/2017   Component Date Value    Alcohol, Urine 11/02/2017 <10     Benzodiazepines 11/02/2017 Negative     Methadone metabolites 11/02/2017 Negative     Cocaine (Metab.) 11/02/2017 Negative     Opiate Scrn, Ur 11/02/2017 Negative     Barbiturate Screen, Ur 11/02/2017 Negative     Amphetamine Screen, Ur 11/02/2017 Negative     THC 11/02/2017 Negative     Phencyclidine 11/02/2017 Negative     Creatinine, Random Ur 11/02/2017 269.0     Toxicology Information 11/02/2017 SEE COMMENT    Clinical Support on 10/31/2017   Component Date Value    EF 10/31/2017 65     Mitral Valve Regurgitati* 10/31/2017 TRIVIAL TO MILD     Diastolic Dysfunction 10/31/2017 No     Est. PA Systolic Pressure 10/31/2017 35.04     Tricuspid Valve Regurgit* 10/31/2017 MILD        Pulmonary Function Tests 10/27/2017   FVC 5.07   FEV1 3.41   TLC (liters) 6.26   DLCO (ml/mmHg sec) 18.8   FVC% 111   FEV1% 93   FEF 25-75 1.91   FEF 25-75% 52   TLC% 92   RV 1.09   RV% 44   DLCO% 71     10/27/2017---------Distance: 441.96 meters (1450 feet)  O2 Sat % Supplemental Oxygen Heart Rate Blood Pressure Binh Scale   Pre-exercise  (Resting) 99 % Room Air 69 bpm 176/88 mmHg 0   During Exercise 96 % Room Air 102 bpm 199/91 mmHg 0   Post-exercise  (Recovery) 99 % Room Air  77 bpm 172/90 mmHg      Recovery Time:  135 seconds    Imaging:  Results for  orders placed during the hospital encounter of 10/23/17   CT Chest Without Contrast    Narrative CT chest without contrast    Comparison: None    Results: The chest was surveyed from the apices through the costophrenic angles.  High resolution images reformatted.    Cystic lucencies predominantly in the upper lung zone suggestive of emphysema.  Subpleural fibrotic changes, honeycombing, at the lung bases predominantly, concerning for changes of fibrosis.  No pulmonary nodule, mass or air space consolidation.  No significant groundglass opacity to suggest NSIP.  No pleural effusion.  Calcified granuloma within the lingula.  The axillary regions appear normal.  The upper abdominal organs demonstrate no abnormalities.  The osseous structures demonstrate no osseous lesions.    Impression Emphysema.  Fibrotic changes at the lung bases concerning for usual interstitial pneumonia.  Pulmonary consult advised.    EPIC notification system activated.       Electronically signed by: ADITI GARY MD  Date:     10/23/17  Time:    10:49        Cardiodiagnostics:  TTE:  CONCLUSIONS     1 - Normal left ventricular systolic function (EF 60-65%).     2 - No wall motion abnormalities.     3 - Normal left ventricular diastolic function.     4 - Normal right ventricular systolic function .     5 - The estimated PA systolic pressure is 35 mmHg.     6 - Trivial to mild mitral regurgitation.     7 - Mild tricuspid regurgitation.   This document has been electronically    SIGNED BY: Jennifer Aguillon MD On: 10/31/2017 12:08    Assessment:  1. Centrilobular emphysema    2. UIP (usual interstitial pneumonitis)    3. Lung transplant candidate      Plan:   1. At this moment I would continue to observe him. His is asymptomatic and there is no obstruction so there is no indication for inhaler therapy for emphysema. As far as his fibrosis, his CT would be consistent with a UIP pattern but bullae would not allow us to make this diagnosis by  radiographic criteria only. Still, with his age I feel confident about it being the diagnosis and would not recommend a biopsy. As far as treatment, with a supranormal FVC I would continue to observe before starting the patient on an anti fibrotic agent. I explained to him and his wife that anti fibrotics carry several side effects and that at this moment I believed the risks of starting them outweighed the benefits. If he loses about 10% of his FVC in a 12 month period I would recommend starting one of them.     2. His condition is at very early stages and he is not a transplant candidate at the moment.     3. I would advise follow up with Dr. Del Real every six months with spirometry and diffusion capacity.     4. RTC as needed.     Thank you for allowing me to participate in the care of your patient. Please, do not hesitate to contact me if you have any questions.      Tanamy Acharya MD  Medical Director of Lung Transplantation  [facility]

## 2017-11-06 NOTE — TELEPHONE ENCOUNTER
I checked in with Pt to see if he had any questions about his echo result or visit with Dr. Acharya. He said he felt he understands his diagnosis and the plan to monitor his disease, which is currently asymptomatic and not limiting in any way. All questions answered.   Plan for repeat CT in 6 months and clinic follow up.   Hussein Freitas, PGY-5  Pulmonary & Critical Care Medicine  pager [phone]

## 2017-12-14 RX ORDER — PRAVASTATIN SODIUM 20 MG/1
TABLET ORAL
Qty: 12 TABLET | Refills: 2 | Status: SHIPPED | OUTPATIENT
Start: 2017-12-14 | End: 2018-06-06

## 2018-03-13 ENCOUNTER — OFFICE VISIT (OUTPATIENT)
Dept: OPTOMETRY | Facility: CLINIC | Age: 63
End: 2018-03-13
Payer: COMMERCIAL

## 2018-03-13 DIAGNOSIS — H00.025 HORDEOLUM INTERNUM LEFT LOWER EYELID: Primary | ICD-10-CM

## 2018-03-13 DIAGNOSIS — L03.213 PRESEPTAL CELLULITIS OF LEFT LOWER EYELID: ICD-10-CM

## 2018-03-13 PROCEDURE — 92012 INTRM OPH EXAM EST PATIENT: CPT | Mod: S$GLB,,, | Performed by: OPTOMETRIST

## 2018-03-13 PROCEDURE — 99999 PR PBB SHADOW E&M-EST. PATIENT-LVL I: CPT | Mod: PBBFAC,,, | Performed by: OPTOMETRIST

## 2018-03-13 RX ORDER — NEOMYCIN SULFATE, POLYMYXIN B SULFATE AND DEXAMETHASONE 3.5; 10000; 1 MG/ML; [USP'U]/ML; MG/ML
1 SUSPENSION/ DROPS OPHTHALMIC 4 TIMES DAILY
Qty: 5 ML | Refills: 0 | Status: SHIPPED | OUTPATIENT
Start: 2018-03-13 | End: 2020-12-16 | Stop reason: ALTCHOICE

## 2018-03-13 RX ORDER — AMOXICILLIN AND CLAVULANATE POTASSIUM 500; 125 MG/1; MG/1
1 TABLET, FILM COATED ORAL 2 TIMES DAILY
Qty: 14 TABLET | Refills: 0 | Status: SHIPPED | OUTPATIENT
Start: 2018-03-13 | End: 2018-03-20

## 2018-03-13 NOTE — PROGRESS NOTES
HPI     Left lower eyelid swollen x 2 days  Redness and tenderness, constant  Used warm compress for 45 min with min improvement  Pt says he is prone to styes  Cheek is swollen, no fever    Last edited by Harvey Gayle, OD on 3/13/2018 10:23 AM. (History)            Assessment /Plan     For exam results, see Encounter Report.    Hordeolum internum left lower eyelid  -     neomycin-polymyxin-dexamethasone (MAXITROL) 3.5mg/mL-10,000 unit/mL-0.1 % DrpS; Place 1 drop into the left eye 4 (four) times daily.  Dispense: 5 mL; Refill: 0  -     amoxicillin-clavulanate 500-125mg (AUGMENTIN) 500-125 mg Tab; Take 1 tablet (500 mg total) by mouth 2 (two) times daily.  Dispense: 14 tablet; Refill: 0    Preseptal cellulitis of left lower eyelid  -     neomycin-polymyxin-dexamethasone (MAXITROL) 3.5mg/mL-10,000 unit/mL-0.1 % DrpS; Place 1 drop into the left eye 4 (four) times daily.  Dispense: 5 mL; Refill: 0  -     amoxicillin-clavulanate 500-125mg (AUGMENTIN) 500-125 mg Tab; Take 1 tablet (500 mg total) by mouth 2 (two) times daily.  Dispense: 14 tablet; Refill: 0      1. Start drops and antibiotic pills, also continue warm compress. RTC 2-3 days if no better. Eye white and quiet.     Addend, pt called 3/14/18 and stated his eye was better, but pills causing bad diarrhea, told to stop and continue drops and warm compresses.

## 2018-06-06 RX ORDER — PRAVASTATIN SODIUM 20 MG/1
TABLET ORAL
Qty: 90 TABLET | Refills: 3 | Status: SHIPPED | OUTPATIENT
Start: 2018-06-06 | End: 2019-07-15

## 2018-07-07 ENCOUNTER — HOSPITAL ENCOUNTER (EMERGENCY)
Facility: HOSPITAL | Age: 63
Discharge: HOME | End: 2018-07-07
Attending: EMERGENCY MEDICINE
Payer: COMMERCIAL

## 2018-07-07 VITALS
HEART RATE: 72 BPM | RESPIRATION RATE: 20 BRPM | SYSTOLIC BLOOD PRESSURE: 138 MMHG | OXYGEN SATURATION: 100 % | DIASTOLIC BLOOD PRESSURE: 76 MMHG | BODY MASS INDEX: 26.82 KG/M2 | TEMPERATURE: 98 F | HEIGHT: 72 IN | WEIGHT: 198 LBS

## 2018-07-07 DIAGNOSIS — S86.911A KNEE STRAIN, RIGHT, INITIAL ENCOUNTER: Primary | ICD-10-CM
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
## 2018-07-07 PROCEDURE — 99283 EMERGENCY DEPT VISIT LOW MDM: CPT | Mod: 25

## 2018-07-07 PROCEDURE — 25000003 PHARM REV CODE 250: Performed by: NURSE PRACTITIONER

## 2018-07-07 PROCEDURE — 29505 APPLICATION LONG LEG SPLINT: CPT | Mod: RT

## 2018-07-07 RX ORDER — TRAMADOL HYDROCHLORIDE 50 MG/1
50 TABLET ORAL EVERY 6 HOURS PRN
Qty: 12 TABLET | Refills: 0 | Status: SHIPPED | OUTPATIENT
Start: 2018-07-07 | End: 2018-07-17

## 2018-07-07 RX ORDER — TRAMADOL HYDROCHLORIDE 50 MG/1
50 TABLET ORAL
Status: COMPLETED | OUTPATIENT
Start: 2018-07-07 | End: 2018-07-07

## 2018-07-07 RX ADMIN — TRAMADOL HYDROCHLORIDE 50 MG: 50 TABLET, COATED ORAL at 04:07

## 2018-07-07 NOTE — ED PROVIDER NOTES
Encounter Date: 7/7/2018       History     Chief Complaint   Patient presents with    Knee Pain     Pt complains of right knee pain after stepping down off of step stool and feeling pain. Took Ibuprophen but no relief     Pt presents for R knee pain since yesterday afternoon after missing a step on a stool he was working on. Knee has swelling, pt can not bear weight nor extend fully. Pt tried ice at home for injury with little relief. Pt came into ER with crutches. Pt does have some mild arthritis in his R knee, but he still plays tennis everyday with his wife.       The history is provided by the patient and the spouse.   Knee Pain   This is a new problem. The current episode started yesterday. The problem occurs constantly. The problem has been gradually worsening. Pertinent negatives include no chest pain, no abdominal pain, no headaches and no shortness of breath. The symptoms are aggravated by walking and standing. Nothing relieves the symptoms. He has tried a cold compress for the symptoms. The treatment provided no relief.     Review of patient's allergies indicates:  No Known Allergies  Past Medical History:   Diagnosis Date    Cataract     Emphysema (subcutaneous) (surgical) resulting from a procedure     Hyperlipidemia     ILD (interstitial lung disease)     Osteoarthritis      Past Surgical History:   Procedure Laterality Date    FACIAL COSMETIC SURGERY      left ptosis repair      wrist ganglion cyst       Family History   Problem Relation Age of Onset    Heart disease Father     Heart disease Brother     Liver cancer Brother     Macular degeneration Mother     Blindness Mother     Glaucoma Maternal Grandmother     Esophageal cancer Brother      Social History   Substance Use Topics    Smoking status: Former Smoker     Packs/day: 0.30     Years: 25.00     Types: Cigarettes     Quit date: 8/2/2014    Smokeless tobacco: Never Used    Alcohol use No     Review of Systems   Constitutional:  Positive for activity change (since fall off step). Negative for fever.   HENT: Negative for congestion, sore throat and trouble swallowing.    Respiratory: Negative for cough, chest tightness and shortness of breath.    Cardiovascular: Negative for chest pain, palpitations and leg swelling.   Gastrointestinal: Negative for abdominal pain, constipation, diarrhea, nausea and vomiting.   Genitourinary: Negative for difficulty urinating.   Musculoskeletal: Positive for gait problem and joint swelling (from fall). Negative for back pain.   Skin: Negative for rash.   Neurological: Negative for weakness and headaches.   Hematological: Does not bruise/bleed easily.   Psychiatric/Behavioral: Negative for confusion.       Physical Exam     Initial Vitals [07/07/18 1601]   BP Pulse Resp Temp SpO2   138/76 72 20 98.2 °F (36.8 °C) 100 %      MAP       --         Physical Exam    Nursing note and vitals reviewed.  Constitutional: Vital signs are normal. He appears well-developed and well-nourished. He is cooperative.  Non-toxic appearance. He does not appear ill.   HENT:   Head: Normocephalic and atraumatic.   Nose: Nose normal.   Eyes: Conjunctivae, EOM and lids are normal. Pupils are equal, round, and reactive to light.   Neck: Full passive range of motion without pain.   Cardiovascular: Normal rate, regular rhythm and normal heart sounds.   Pulmonary/Chest: Effort normal and breath sounds normal.   Abdominal: Soft. Normal appearance and bowel sounds are normal. There is no tenderness.   Musculoskeletal:        Right hip: Normal.        Right knee: He exhibits decreased range of motion, swelling and abnormal patellar mobility. He exhibits no ecchymosis, no deformity, no laceration, no erythema and no bony tenderness.        Right ankle: Normal.        Right upper leg: Normal.        Right lower leg: Normal.   Neurological: He is alert and oriented to person, place, and time. He has normal strength. No cranial nerve deficit or  sensory deficit.   Skin: Skin is warm, dry and intact. Capillary refill takes less than 2 seconds. No abrasion, no bruising, no ecchymosis and no rash noted. No erythema.   Psychiatric: He has a normal mood and affect. His speech is normal and behavior is normal.         ED Course   Procedures  Labs Reviewed - No data to display       Imaging Results          X-Ray Knee 3 View Right (Final result)  Result time 07/07/18 16:40:18    Final result by Dolores Mir MD (07/07/18 16:40:18)                 Impression:      Tricompartmental degenerative changes with a large suprapatellar joint effusion.  No fracture or dislocation is seen.      Electronically signed by: Dolores Mir MD  Date:    07/07/2018  Time:    16:40             Narrative:    EXAMINATION:  XR KNEE 3 VIEW RIGHT    CLINICAL HISTORY:  Unspecified fall, initial encounter    TECHNIQUE:  AP, lateral, and Merchant views of the right knee were performed.    COMPARISON:  None    FINDINGS:  There are tricompartmental degenerative changes, most prominently affecting the lateral compartment, with joint space narrowing, subchondral sclerosis and marginal osteophytosis.  No fracture or dislocation is identified.  There is a large suprapatellar joint effusion.                                 Medical Decision Making:   Initial Assessment:   Pt presents with R knee after missing step on step stool. R knee edematous, decreased ROM, popliteal pulse +2, DP +2. Pt can not bear weight, came to ER with crutches. Pt used ice at home, no OTC meds for pain.  Differential Diagnosis:   Strain, sprain, fx  ED Management:  Xray of knee- Tricompartmental degenerative changes with a large suprapatellar joint effusion.  No fracture or dislocation   Knee immobilizer to facilitate mobility and pain relief along with pain meds. Pt to f/u with ortho Monday.              Attending Attestation:     Physician Attestation Statement for NP/PA:   I have conducted a face to face encounter  with this patient in addition to the NP/PA, due to NP/PA Request    Other NP/PA Attestation Additions:    History of Present Illness: 63-year-old male with a right knee strain.   Physical Exam: Diffuse swelling of the right knee with decreased range of motion due to pain.                     Clinical Impression:   The primary encounter diagnosis was Knee strain, right, initial encounter. A diagnosis of Fall was also pertinent to this visit.                             Brennan Rincon NP  07/08/18 0001       Gordo Hutosn MD  07/08/18 0005

## 2018-07-07 NOTE — DISCHARGE INSTRUCTIONS
Call your Orthopedic doctor Monday for appointment for knee. Take pain medication as directed on bottle. Do not drive while taking pain medication.

## 2018-10-17 ENCOUNTER — PATIENT MESSAGE (OUTPATIENT)
Dept: INTERNAL MEDICINE | Facility: CLINIC | Age: 63
End: 2018-10-17

## 2018-10-17 NOTE — TELEPHONE ENCOUNTER
Could be statin related but could be other.  Can stop pravastatin for now.  He is due for OV and l;ab f/u

## 2018-11-28 ENCOUNTER — OFFICE VISIT (OUTPATIENT)
Dept: OPTOMETRY | Facility: CLINIC | Age: 63
End: 2018-11-28
Payer: COMMERCIAL

## 2018-11-28 DIAGNOSIS — H02.402 PTOSIS OF LEFT EYELID: ICD-10-CM

## 2018-11-28 DIAGNOSIS — H52.203 MYOPIA WITH ASTIGMATISM AND PRESBYOPIA, BILATERAL: ICD-10-CM

## 2018-11-28 DIAGNOSIS — H52.13 MYOPIA WITH ASTIGMATISM AND PRESBYOPIA, BILATERAL: ICD-10-CM

## 2018-11-28 DIAGNOSIS — H25.13 NUCLEAR SCLEROSIS, BILATERAL: Primary | ICD-10-CM

## 2018-11-28 DIAGNOSIS — H52.4 MYOPIA WITH ASTIGMATISM AND PRESBYOPIA, BILATERAL: ICD-10-CM

## 2018-11-28 PROCEDURE — 92014 COMPRE OPH EXAM EST PT 1/>: CPT | Mod: S$GLB,,, | Performed by: OPTOMETRIST

## 2018-11-28 PROCEDURE — 92015 DETERMINE REFRACTIVE STATE: CPT | Mod: S$GLB,,, | Performed by: OPTOMETRIST

## 2018-11-28 PROCEDURE — 99999 PR PBB SHADOW E&M-EST. PATIENT-LVL II: CPT | Mod: PBBFAC,,, | Performed by: OPTOMETRIST

## 2018-11-28 RX ORDER — NINTEDANIB 150 MG/1
CAPSULE ORAL
COMMUNITY
Start: 2018-11-14

## 2018-11-28 NOTE — MEDICAL/APP STUDENT
Pt presents today for annual exam. Pt reports increased blur OS>OS. Pt's near vision stable. (-) itchy, watery, red eyes (-) loss of vision (-) flashes of light or floaters (-) DM/HTN

## 2018-11-28 NOTE — PROGRESS NOTES
LEIF VELAZQUEZ 03/2018  Pt presents today for annual exam. Pt reports increased blur OS>OS. Pt's   near vision stable. (-) itchy, watery, red eyes (-) loss of vision (-)   flashes of light or floaters (-) DM/HTN    Last edited by Harvey Gayle, OD on 11/28/2018  9:54 AM. (History)              Assessment /Plan     For exam results, see Encounter Report.    Nuclear sclerosis, bilateral    Ptosis of left eyelid    Myopia with astigmatism and presbyopia, bilateral      1. Myopic shift. Educated pt on presence of cataracts and effects on vision. No surgery at this time. Recheck in one year.  2. Monitor condition. Patient to report any changes. RTC 1 year recheck.  3. Spec Rx given. Different lens options discussed with patient. RTC 1 year full exam.

## 2019-06-19 ENCOUNTER — TELEPHONE (OUTPATIENT)
Dept: OPHTHALMOLOGY | Facility: CLINIC | Age: 64
End: 2019-06-19

## 2019-07-15 ENCOUNTER — OFFICE VISIT (OUTPATIENT)
Dept: OPTOMETRY | Facility: CLINIC | Age: 64
End: 2019-07-15
Payer: MEDICARE

## 2019-07-15 DIAGNOSIS — H52.4 MYOPIA WITH ASTIGMATISM AND PRESBYOPIA, BILATERAL: ICD-10-CM

## 2019-07-15 DIAGNOSIS — H25.13 NUCLEAR SCLEROSIS, BILATERAL: Primary | ICD-10-CM

## 2019-07-15 DIAGNOSIS — H52.13 MYOPIA WITH ASTIGMATISM AND PRESBYOPIA, BILATERAL: ICD-10-CM

## 2019-07-15 DIAGNOSIS — H52.203 MYOPIA WITH ASTIGMATISM AND PRESBYOPIA, BILATERAL: ICD-10-CM

## 2019-07-15 PROCEDURE — 92015 DETERMINE REFRACTIVE STATE: CPT | Mod: ,,, | Performed by: OPTOMETRIST

## 2019-07-15 PROCEDURE — 92012 PR EYE EXAM, EST PATIENT,INTERMED: ICD-10-PCS | Mod: S$PBB,,, | Performed by: OPTOMETRIST

## 2019-07-15 PROCEDURE — 92015 PR REFRACTION: ICD-10-PCS | Mod: ,,, | Performed by: OPTOMETRIST

## 2019-07-15 PROCEDURE — 92012 INTRM OPH EXAM EST PATIENT: CPT | Mod: S$PBB,,, | Performed by: OPTOMETRIST

## 2019-07-15 PROCEDURE — 99999 PR PBB SHADOW E&M-EST. PATIENT-LVL II: ICD-10-PCS | Mod: PBBFAC,,, | Performed by: OPTOMETRIST

## 2019-07-15 PROCEDURE — 99212 OFFICE O/P EST SF 10 MIN: CPT | Mod: PBBFAC,PO | Performed by: OPTOMETRIST

## 2019-07-15 PROCEDURE — 99999 PR PBB SHADOW E&M-EST. PATIENT-LVL II: CPT | Mod: PBBFAC,,, | Performed by: OPTOMETRIST

## 2019-07-15 NOTE — PROGRESS NOTES
LEIF VELAZQUEZ 11/2018  Patient noticed distance vision is blurred for the past 3 or   4 weeks.  Glasses about 9 months old and distance only.  Not using any   drops.     Last edited by Haylee Stover on 7/15/2019  2:03 PM. (History)            Assessment /Plan     For exam results, see Encounter Report.    Nuclear sclerosis, bilateral    Myopia with astigmatism and presbyopia, bilateral      1. Causing myopic shift.  2. New Spec Rx given. Different lens options discussed with patient. RTC 6 mos full.

## 2019-10-08 ENCOUNTER — TELEPHONE (OUTPATIENT)
Dept: INTERNAL MEDICINE | Facility: CLINIC | Age: 64
End: 2019-10-08

## 2019-10-08 DIAGNOSIS — E55.9 VITAMIN D DEFICIENCY: ICD-10-CM

## 2019-10-08 DIAGNOSIS — E78.5 DYSLIPIDEMIA: Primary | ICD-10-CM

## 2019-10-08 DIAGNOSIS — Z12.5 PROSTATE CANCER SCREENING: ICD-10-CM

## 2019-10-08 NOTE — TELEPHONE ENCOUNTER
----- Message from Dayan Vides sent at 10/8/2019 12:08 PM CDT -----  Contact: pt   Call pt in regards to scheduling an appt. Pt states that his wife has an appt on 02/04/20 @1 but when we go to schedule his appt it push him back to 02/28/20 and the would like to be seen together.        [phone] (home)

## 2019-10-11 NOTE — TELEPHONE ENCOUNTER
I reached him same day as call and fit him in same day as wife.    Orders entered and linked to lab appt.

## 2019-12-06 ENCOUNTER — TELEPHONE (OUTPATIENT)
Dept: OPHTHALMOLOGY | Facility: CLINIC | Age: 64
End: 2019-12-06

## 2019-12-06 ENCOUNTER — OFFICE VISIT (OUTPATIENT)
Dept: OPTOMETRY | Facility: CLINIC | Age: 64
End: 2019-12-06
Payer: MEDICARE

## 2019-12-06 DIAGNOSIS — H25.13 NUCLEAR SCLEROSIS, BILATERAL: Primary | ICD-10-CM

## 2019-12-06 DIAGNOSIS — H52.13 MYOPIA WITH ASTIGMATISM AND PRESBYOPIA, BILATERAL: ICD-10-CM

## 2019-12-06 DIAGNOSIS — H52.4 MYOPIA WITH ASTIGMATISM AND PRESBYOPIA, BILATERAL: ICD-10-CM

## 2019-12-06 DIAGNOSIS — H02.402 PTOSIS OF LEFT EYELID: ICD-10-CM

## 2019-12-06 DIAGNOSIS — H52.203 MYOPIA WITH ASTIGMATISM AND PRESBYOPIA, BILATERAL: ICD-10-CM

## 2019-12-06 PROCEDURE — 99212 OFFICE O/P EST SF 10 MIN: CPT | Mod: PBBFAC,PO | Performed by: OPTOMETRIST

## 2019-12-06 PROCEDURE — 99999 PR PBB SHADOW E&M-EST. PATIENT-LVL II: ICD-10-PCS | Mod: PBBFAC,,, | Performed by: OPTOMETRIST

## 2019-12-06 PROCEDURE — 92014 PR EYE EXAM, EST PATIENT,COMPREHESV: ICD-10-PCS | Mod: S$PBB,,, | Performed by: OPTOMETRIST

## 2019-12-06 PROCEDURE — 99999 PR PBB SHADOW E&M-EST. PATIENT-LVL II: CPT | Mod: PBBFAC,,, | Performed by: OPTOMETRIST

## 2019-12-06 PROCEDURE — 92014 COMPRE OPH EXAM EST PT 1/>: CPT | Mod: S$PBB,,, | Performed by: OPTOMETRIST

## 2019-12-06 NOTE — PROGRESS NOTES
"LEIF VELAZQUEZ 07/2019  Here for annual exam today.  Patient had new glasses made in   July and now OS seems worse.  Street signs and stop signs look like "they   wearing sweaters."  Not using an drops. Patient would like to wait a few   month before cataract consultation.     Last edited by Haylee Stover on 12/6/2019 10:38 AM. (History)            Assessment /Plan     For exam results, see Encounter Report.    Nuclear sclerosis, bilateral    Myopia with astigmatism and presbyopia, bilateral    Ptosis of left eyelid      1. Refer to Dr. Perry for cataract evaluation and possible removal.   2. Hold off on spec rx for now.  3. History of repair. Monitor condition. Patient to report any changes. RTC 1 year recheck.               "

## 2019-12-06 NOTE — TELEPHONE ENCOUNTER
----- Message from Chris Cesar sent at 12/6/2019 12:53 PM CST -----  Contact: Pt  Pt is calling to set up an appt.    Pt# [phone]

## 2020-01-13 ENCOUNTER — OFFICE VISIT (OUTPATIENT)
Dept: OPHTHALMOLOGY | Facility: CLINIC | Age: 65
End: 2020-01-13
Payer: MEDICARE

## 2020-01-13 DIAGNOSIS — H43.813 VITREOUS DETACHMENT OF BOTH EYES: ICD-10-CM

## 2020-01-13 DIAGNOSIS — H52.7 REFRACTIVE ERROR: ICD-10-CM

## 2020-01-13 DIAGNOSIS — H25.13 NUCLEAR SCLEROSIS OF BOTH EYES: Primary | ICD-10-CM

## 2020-01-13 DIAGNOSIS — H02.402 PTOSIS OF LEFT EYELID: ICD-10-CM

## 2020-01-13 PROCEDURE — 99999 PR PBB SHADOW E&M-EST. PATIENT-LVL II: ICD-10-PCS | Mod: PBBFAC,,, | Performed by: OPHTHALMOLOGY

## 2020-01-13 PROCEDURE — 92014 PR EYE EXAM, EST PATIENT,COMPREHESV: ICD-10-PCS | Mod: S$PBB,,, | Performed by: OPHTHALMOLOGY

## 2020-01-13 PROCEDURE — 99212 OFFICE O/P EST SF 10 MIN: CPT | Mod: PBBFAC,PO,25 | Performed by: OPHTHALMOLOGY

## 2020-01-13 PROCEDURE — 92014 COMPRE OPH EXAM EST PT 1/>: CPT | Mod: S$PBB,,, | Performed by: OPHTHALMOLOGY

## 2020-01-13 PROCEDURE — 99999 PR PBB SHADOW E&M-EST. PATIENT-LVL II: CPT | Mod: PBBFAC,,, | Performed by: OPHTHALMOLOGY

## 2020-01-13 PROCEDURE — 92136 BIOMETRY: ICD-10-PCS | Mod: 26,S$PBB,LT, | Performed by: OPHTHALMOLOGY

## 2020-01-13 PROCEDURE — 92136 OPHTHALMIC BIOMETRY: CPT | Mod: PBBFAC,PO | Performed by: OPHTHALMOLOGY

## 2020-01-14 ENCOUNTER — PATIENT MESSAGE (OUTPATIENT)
Dept: INTERNAL MEDICINE | Facility: CLINIC | Age: 65
End: 2020-01-14

## 2020-01-14 ENCOUNTER — TELEPHONE (OUTPATIENT)
Dept: OPHTHALMOLOGY | Facility: CLINIC | Age: 65
End: 2020-01-14

## 2020-01-14 DIAGNOSIS — H25.12 NS (NUCLEAR SCLEROSIS), LEFT: Primary | ICD-10-CM

## 2020-01-14 PROBLEM — H02.402 PTOSIS OF LEFT EYELID: Status: ACTIVE | Noted: 2020-01-14

## 2020-01-14 PROBLEM — H43.813 VITREOUS DETACHMENT OF BOTH EYES: Status: ACTIVE | Noted: 2020-01-14

## 2020-01-14 PROBLEM — H25.13 NUCLEAR SCLEROSIS OF BOTH EYES: Status: ACTIVE | Noted: 2020-01-14

## 2020-01-14 PROBLEM — H52.7 REFRACTIVE ERROR: Status: ACTIVE | Noted: 2020-01-14

## 2020-01-14 RX ORDER — NEPAFENAC 3 MG/ML
1 SUSPENSION/ DROPS OPHTHALMIC DAILY
Qty: 3 ML | Refills: 1 | Status: SHIPPED | OUTPATIENT
Start: 2020-02-01 | End: 2020-03-03

## 2020-01-14 RX ORDER — OFLOXACIN 3 MG/ML
1 SOLUTION/ DROPS OPHTHALMIC 4 TIMES DAILY
Qty: 5 ML | Refills: 1 | Status: SHIPPED | OUTPATIENT
Start: 2020-02-01 | End: 2020-02-11

## 2020-01-14 RX ORDER — DIFLUPREDNATE OPHTHALMIC 0.5 MG/ML
1 EMULSION OPHTHALMIC 4 TIMES DAILY
Qty: 5 ML | Refills: 1 | Status: SHIPPED | OUTPATIENT
Start: 2020-02-04 | End: 2020-03-05

## 2020-01-14 NOTE — PROGRESS NOTES
Subjective:       Patient ID: Esteban Carson is a 64 y.o. male.    Chief Complaint: Cataract    HPI     Referred: Dr. Gayle    63 y/o male is here for Cataract Eval. Pt states VA is blurry OS. Glare is   a bother. Pt denies floaters, flashes, and eye allergies.     Eyemeds  No gtts    Last edited by Giovana Burnett on 1/13/2020  1:05 PM. (History)             Assessment:       1. Nuclear sclerosis of both eyes    2. Ptosis of left eyelid    3. Vitreous detachment of both eyes    4. Refractive error        Plan:       Visually significant cataract OU -Pt. Wants Sx.     ANGÉLICA ptosis-Stable.  PVD's OU-STable.  RE-Pt wants Toric IOL OS.        Cataract Surgery Consent: Patient with a visually significant cataract with difficulties of ADLs, reading, driving, night vision, glare (any and all).  Discussed with Patient/Family/Caregiver: options, risks and benefits, expectations of cataract surgery, utilized an eye model with questions and answers to facilitate discussion.  Discussed lens options and patient understands that glasses may be required for optimal vision for distance and/or near vision after cataract surgery.  The Patient/Family/Caregiver  voice good understanding and patient wishes to proceed with surgery.  The patient will likely benefit from surgery and patient signed consent for Left Eye.  CE OS 2/4/2020 with Toric IOL,        OD 2/18/2020 17.0.

## 2020-01-14 NOTE — TELEPHONE ENCOUNTER
----- Message from Nohelia Devlin sent at 1/14/2020  2:22 PM CST -----  Contact:    Pt  [phone]  Pt  Called stating that he paid for  his  Toric lens for his surgery 2/4. Call the pt back to verify

## 2020-01-15 ENCOUNTER — TELEPHONE (OUTPATIENT)
Dept: INTERNAL MEDICINE | Facility: CLINIC | Age: 65
End: 2020-01-15

## 2020-01-15 NOTE — TELEPHONE ENCOUNTER
----- Message from Mariel Donovan sent at 1/15/2020  9:32 AM CST -----  Contact: self  Type:  Yearly check up Appointment Request      Name of Caller:   Patient has appointment for 3/20/2020   Patient need April 22/2020 around 9:30am or 10am   When is the first available appointment?  Symptoms:  Would the patient rather a call back or a response via "[employer]? call  Best Call Back Number [phone]  Additional Information:

## 2020-01-15 NOTE — TELEPHONE ENCOUNTER
"JOYCE message sent to the pt:    "Mr. Carson,    There are no available appts for annual physicals on the date and time that you are requesting.    We attempted to schedule you and your wife on the same date with close time slots.    The next available time for a physical on the date requested is in the afternoon with Dr. Aguiar and a resident.    Thanks."    "

## 2020-01-16 ENCOUNTER — TELEPHONE (OUTPATIENT)
Dept: OPHTHALMOLOGY | Facility: CLINIC | Age: 65
End: 2020-01-16

## 2020-01-16 DIAGNOSIS — H25.11 NS (NUCLEAR SCLEROSIS), RIGHT: Primary | ICD-10-CM

## 2020-01-20 ENCOUNTER — PATIENT MESSAGE (OUTPATIENT)
Dept: INTERNAL MEDICINE | Facility: CLINIC | Age: 65
End: 2020-01-20

## 2020-01-30 ENCOUNTER — TELEPHONE (OUTPATIENT)
Dept: OPHTHALMOLOGY | Facility: CLINIC | Age: 65
End: 2020-01-30

## 2020-01-30 NOTE — H&P
[facility]  History & Physical    Subjective:      Chief Complaint/Reason for Admission:     Esteban Carson is a 65 y.o. male.    Past Medical History:   Diagnosis Date    Cataract     Emphysema (subcutaneous) (surgical) resulting from a procedure     Hyperlipidemia     ILD (interstitial lung disease)     Osteoarthritis      Past Surgical History:   Procedure Laterality Date    FACIAL COSMETIC SURGERY      left ptosis repair      wrist ganglion cyst       Family History   Problem Relation Age of Onset    Heart disease Father     Heart disease Brother     Liver cancer Brother     Macular degeneration Mother     Blindness Mother     Glaucoma Maternal Grandmother     Esophageal cancer Brother      Social History     Tobacco Use    Smoking status: Former Smoker     Packs/day: 0.30     Years: 25.00     Pack years: 7.50     Types: Cigarettes     Last attempt to quit: 2014     Years since quittin.4    Smokeless tobacco: Never Used   Substance Use Topics    Alcohol use: No    Drug use: No       No medications prior to admission.     Review of patient's allergies indicates:  No Known Allergies     Review of Systems   Eyes: Positive for blurred vision.   All other systems reviewed and are negative.      Objective:      Vital Signs (Most Recent)       Vital Signs Range (Last 24H):  BP: ()/()   Arterial Line BP: ()/()     Physical Exam   Constitutional: He is oriented to person, place, and time. He appears well-developed and well-nourished.   HENT:   Head: Normocephalic.   Eyes: Pupils are equal, round, and reactive to light. Conjunctivae and EOM are normal.   Neck: Normal range of motion. Neck supple.   Cardiovascular: Normal rate.   Pulmonary/Chest: Effort normal and breath sounds normal.   Abdominal: Soft. Bowel sounds are normal.   Musculoskeletal: Normal range of motion.   Neurological: He is alert and oriented to person, place, and time.   Skin: Skin is warm.    Psychiatric: He has a normal mood and affect.       Data Review:    ECG:     Assessment:      Cataract OS.    Plan:    CE OS.

## 2020-02-04 ENCOUNTER — ANESTHESIA (OUTPATIENT)
Dept: SURGERY | Facility: HOSPITAL | Age: 65
End: 2020-02-04
Payer: MEDICARE

## 2020-02-04 ENCOUNTER — ANESTHESIA EVENT (OUTPATIENT)
Dept: SURGERY | Facility: HOSPITAL | Age: 65
End: 2020-02-04
Payer: MEDICARE

## 2020-02-04 ENCOUNTER — HOSPITAL ENCOUNTER (OUTPATIENT)
Facility: HOSPITAL | Age: 65
Discharge: HOME | End: 2020-02-04
Attending: OPHTHALMOLOGY | Admitting: OPHTHALMOLOGY
Payer: MEDICARE

## 2020-02-04 VITALS
TEMPERATURE: 98 F | RESPIRATION RATE: 18 BRPM | HEART RATE: 57 BPM | WEIGHT: 200 LBS | SYSTOLIC BLOOD PRESSURE: 156 MMHG | DIASTOLIC BLOOD PRESSURE: 79 MMHG | HEIGHT: 73 IN | OXYGEN SATURATION: 96 % | BODY MASS INDEX: 26.51 KG/M2

## 2020-02-04 DIAGNOSIS — H25.12 NUCLEAR SENILE CATARACT OF LEFT EYE: ICD-10-CM

## 2020-02-04 DIAGNOSIS — H25.10 SENILE NUCLEAR SCLEROSIS: ICD-10-CM

## 2020-02-04 PROCEDURE — D9220A PRA ANESTHESIA: Mod: CRNA,,, | Performed by: NURSE ANESTHETIST, CERTIFIED REGISTERED

## 2020-02-04 PROCEDURE — 36000706: Performed by: OPHTHALMOLOGY

## 2020-02-04 PROCEDURE — V2632 POST CHMBR INTRAOCULAR LENS: HCPCS | Performed by: OPHTHALMOLOGY

## 2020-02-04 PROCEDURE — 37000008 HC ANESTHESIA 1ST 15 MINUTES: Performed by: OPHTHALMOLOGY

## 2020-02-04 PROCEDURE — 99499 UNLISTED E&M SERVICE: CPT | Mod: CSM,LT,, | Performed by: OPHTHALMOLOGY

## 2020-02-04 PROCEDURE — D9220A PRA ANESTHESIA: ICD-10-PCS | Mod: CRNA,,, | Performed by: NURSE ANESTHETIST, CERTIFIED REGISTERED

## 2020-02-04 PROCEDURE — 71000044 HC DOSC ROUTINE RECOVERY FIRST HOUR: Performed by: OPHTHALMOLOGY

## 2020-02-04 PROCEDURE — 36000707: Performed by: OPHTHALMOLOGY

## 2020-02-04 PROCEDURE — 37000009 HC ANESTHESIA EA ADD 15 MINS: Performed by: OPHTHALMOLOGY

## 2020-02-04 PROCEDURE — 99499 PR TORIC IOL EVAL: ICD-10-PCS | Mod: CSM,LT,, | Performed by: OPHTHALMOLOGY

## 2020-02-04 PROCEDURE — D9220A PRA ANESTHESIA: Mod: ANES,,, | Performed by: ANESTHESIOLOGY

## 2020-02-04 PROCEDURE — 25000003 PHARM REV CODE 250: Performed by: OPHTHALMOLOGY

## 2020-02-04 PROCEDURE — 63600175 PHARM REV CODE 636 W HCPCS: Performed by: NURSE ANESTHETIST, CERTIFIED REGISTERED

## 2020-02-04 PROCEDURE — 66984 PR REMOVAL, CATARACT, W/INSRT INTRAOC LENS, W/O ENDO CYCLO: ICD-10-PCS | Mod: LT,,, | Performed by: OPHTHALMOLOGY

## 2020-02-04 PROCEDURE — 66984 XCAPSL CTRC RMVL W/O ECP: CPT | Mod: LT,,, | Performed by: OPHTHALMOLOGY

## 2020-02-04 PROCEDURE — 25000003 PHARM REV CODE 250

## 2020-02-04 PROCEDURE — 71000015 HC POSTOP RECOV 1ST HR: Performed by: OPHTHALMOLOGY

## 2020-02-04 PROCEDURE — D9220A PRA ANESTHESIA: ICD-10-PCS | Mod: ANES,,, | Performed by: ANESTHESIOLOGY

## 2020-02-04 PROCEDURE — 63600175 PHARM REV CODE 636 W HCPCS: Performed by: OPHTHALMOLOGY

## 2020-02-04 DEVICE — IMPLANTABLE DEVICE: Type: IMPLANTABLE DEVICE | Site: EYE | Status: FUNCTIONAL

## 2020-02-04 RX ORDER — ACETAMINOPHEN 325 MG/1
650 TABLET ORAL EVERY 4 HOURS PRN
Status: DISCONTINUED | OUTPATIENT
Start: 2020-02-04 | End: 2020-02-04 | Stop reason: HOSPADM

## 2020-02-04 RX ORDER — LIDOCAINE HYDROCHLORIDE 40 MG/ML
INJECTION, SOLUTION RETROBULBAR
Status: DISCONTINUED
Start: 2020-02-04 | End: 2020-02-04 | Stop reason: HOSPADM

## 2020-02-04 RX ORDER — PREDNISOLONE ACETATE 10 MG/ML
SUSPENSION/ DROPS OPHTHALMIC
Status: DISCONTINUED | OUTPATIENT
Start: 2020-02-04 | End: 2020-02-04 | Stop reason: HOSPADM

## 2020-02-04 RX ORDER — OFLOXACIN 3 MG/ML
SOLUTION/ DROPS OPHTHALMIC
Status: DISCONTINUED | OUTPATIENT
Start: 2020-02-04 | End: 2020-02-04 | Stop reason: HOSPADM

## 2020-02-04 RX ORDER — LIDOCAINE HYDROCHLORIDE 40 MG/ML
INJECTION, SOLUTION RETROBULBAR
Status: DISCONTINUED | OUTPATIENT
Start: 2020-02-04 | End: 2020-02-04 | Stop reason: HOSPADM

## 2020-02-04 RX ORDER — PREDNISOLONE ACETATE 10 MG/ML
SUSPENSION/ DROPS OPHTHALMIC
Status: DISCONTINUED
Start: 2020-02-04 | End: 2020-02-04 | Stop reason: HOSPADM

## 2020-02-04 RX ORDER — MIDAZOLAM HYDROCHLORIDE 1 MG/ML
INJECTION, SOLUTION INTRAMUSCULAR; INTRAVENOUS
Status: DISCONTINUED | OUTPATIENT
Start: 2020-02-04 | End: 2020-02-04

## 2020-02-04 RX ORDER — OFLOXACIN 3 MG/ML
1 SOLUTION/ DROPS OPHTHALMIC
Status: COMPLETED | OUTPATIENT
Start: 2020-02-04 | End: 2020-02-04

## 2020-02-04 RX ORDER — TROPICAMIDE 10 MG/ML
1 SOLUTION/ DROPS OPHTHALMIC
Status: DISPENSED | OUTPATIENT
Start: 2020-02-04

## 2020-02-04 RX ORDER — SODIUM CHLORIDE 9 MG/ML
INJECTION, SOLUTION INTRAVENOUS CONTINUOUS
Status: ACTIVE | OUTPATIENT
Start: 2020-02-04

## 2020-02-04 RX ORDER — FENTANYL CITRATE 50 UG/ML
INJECTION, SOLUTION INTRAMUSCULAR; INTRAVENOUS
Status: DISCONTINUED | OUTPATIENT
Start: 2020-02-04 | End: 2020-02-04

## 2020-02-04 RX ORDER — HYDROCODONE BITARTRATE AND ACETAMINOPHEN 5; 325 MG/1; MG/1
1 TABLET ORAL EVERY 4 HOURS PRN
Status: DISCONTINUED | OUTPATIENT
Start: 2020-02-04 | End: 2020-02-04 | Stop reason: HOSPADM

## 2020-02-04 RX ORDER — FENTANYL CITRATE 50 UG/ML
25 INJECTION, SOLUTION INTRAMUSCULAR; INTRAVENOUS EVERY 5 MIN PRN
Status: CANCELLED | OUTPATIENT
Start: 2020-02-04

## 2020-02-04 RX ORDER — PROPARACAINE HYDROCHLORIDE 5 MG/ML
1 SOLUTION/ DROPS OPHTHALMIC
Status: DISPENSED | OUTPATIENT
Start: 2020-02-04

## 2020-02-04 RX ORDER — PHENYLEPHRINE HYDROCHLORIDE 25 MG/ML
1 SOLUTION/ DROPS OPHTHALMIC
Status: DISPENSED | OUTPATIENT
Start: 2020-02-04

## 2020-02-04 RX ORDER — CYCLOPENTOLATE HYDROCHLORIDE 10 MG/ML
1 SOLUTION/ DROPS OPHTHALMIC
Status: DISPENSED | OUTPATIENT
Start: 2020-02-04

## 2020-02-04 RX ORDER — SODIUM CHLORIDE 0.9 % (FLUSH) 0.9 %
10 SYRINGE (ML) INJECTION
Status: CANCELLED | OUTPATIENT
Start: 2020-02-04

## 2020-02-04 RX ADMIN — TROPICAMIDE 1 DROP: 10 SOLUTION/ DROPS OPHTHALMIC at 01:02

## 2020-02-04 RX ADMIN — PHENYLEPHRINE HYDROCHLORIDE 1 DROP: 2.5 SOLUTION/ DROPS OPHTHALMIC at 01:02

## 2020-02-04 RX ADMIN — PROPARACAINE HYDROCHLORIDE 1 DROP: 5 SOLUTION/ DROPS OPHTHALMIC at 01:02

## 2020-02-04 RX ADMIN — SODIUM CHLORIDE 1000 ML: 0.9 INJECTION, SOLUTION INTRAVENOUS at 01:02

## 2020-02-04 RX ADMIN — FENTANYL CITRATE 25 MCG: 50 INJECTION, SOLUTION INTRAMUSCULAR; INTRAVENOUS at 02:02

## 2020-02-04 RX ADMIN — OFLOXACIN 1 DROP: 3 SOLUTION OPHTHALMIC at 01:02

## 2020-02-04 RX ADMIN — MIDAZOLAM HYDROCHLORIDE 2 MG: 1 INJECTION, SOLUTION INTRAMUSCULAR; INTRAVENOUS at 02:02

## 2020-02-04 RX ADMIN — CYCLOPENTOLATE HYDROCHLORIDE 1 DROP: 10 SOLUTION/ DROPS OPHTHALMIC at 01:02

## 2020-02-04 NOTE — OP NOTE
Operative Date:  02/04/2020    Discharge Date:  02/04/2020    Discharge Patient Home          Report Title: Operative Note      SURGEON: Harvey Perry MD     ASSISTANT: None    PREOPERATIVE DIAGNOSIS: Visually Significant Nuclear Sclerotic Cataract, Left Eye    POSTOPERATIVE DIAGNOSIS: Visually Significant Nuclear Sclerotic Cataract, Left Eye    PROCEDURE PERFORMED: Clear Cornea Phacoemulsification with Toric Intraocular Lens Implant, Left Eye    ANESTHESIA: Topical with MAC     COMPLICATIONS: None    ESTIMATED BLOOD LOSS: Minimal    INDICATIONS FOR PROCEDURE:   The patient is a pleasant 65 year old gentleman with increasing difficulties with activities of daily living secondary to a dense visually significant cataract in the Left Eye.      PROCEDURE IN DETAIL: The patient was brought to the operating room and placed in the upright position.  Topical anesthetic was applied to the eye.  The eye was marked at the 12:00 o'clock, 3:00 o'clock and 9:00 o'clock positions with a marking pen. The the patient was then placed into the supine position.  The eye was then prepped and draped in the usual sterile fashion.  Using the Lopez ring and the guarded yuliana blade set at 0.37 mm, a partial thickness clear cornea incision was made temporally.  A paracentesis site was made at the six o'clock position.  Omidria a was injected into the anterior chamber through the paracentesis site.  Viscoat was injected into the anterior chamber through the paracentesis site.  The eye was then reentered at the primary surgical site with a 2.4 mm keratome followed by continuous capsulotomy, hydrodissection and phacoemulsification of the cataract.  Residual cortical material was removed using automated irrigation-aspiration technique.  Healon was injected into the posterior chamber and an [id number] 14.5 diopter lens was placed in the bag and rotated to the 89 degree meridian without difficulty.  Residual viscoelastic was removed  using automated irrigation-aspiration technique. The eye was re-pressurized using BSS solution and both the paracentesis site and the primary surgical site were demonstrated to be watertight at the end of the case with Weck--Stephanie manipulation. One drop of Ofloxacin and 1 drop of Pred acetate 1% was applied to the Left Eye .The eye was closed, patched and a Ramos shield placed.  The patient was taken to the recovery room in good and stable condition.  The patient tolerated the procedure well and was discharged Home.  The patient was instructed to refrain from any heavy lifting, bending, stooping or straining activities and to follow-up in the morning for routine postoperative care with Harvey Perry MD

## 2020-02-04 NOTE — BRIEF OP NOTE
[facility]  Brief Operative Note    Surgery Date: 2/4/2020     Surgeon(s) and Role:     * Harvey Perry MD - Primary    Assisting Surgeon: None    Pre-op Diagnosis:  NS (nuclear sclerosis), left [H25.12]    Post-op Diagnosis:  Post-Op Diagnosis Codes:     * NS (nuclear sclerosis), left [H25.12]    Procedure(s) (LRB):  PHACOEMULSIFICATION, CATARACT (Left)  INSERTION, IOL PROSTHESIS (Left)    Anesthesia: Local MAC    Description of the findings of the procedure(s):     Estimated Blood Loss: * No values recorded between 2/4/2020  2:34 PM and 2/4/2020  3:08 PM *         Specimens:   Specimen (12h ago, onward)    None            Discharge Note    OUTCOME: Patient tolerated treatment/procedure well without complication and is now ready for discharge.    DISPOSITION: Home or Self Care    FINAL DIAGNOSIS:  Senile nuclear sclerosis    FOLLOWUP: In clinic

## 2020-02-04 NOTE — TRANSFER OF CARE
"Anesthesia Transfer of Care Note    Patient: Esteban Carson    Procedure(s) Performed: Procedure(s) (LRB):  PHACOEMULSIFICATION, CATARACT (Left)  INSERTION, IOL PROSTHESIS (Left)    Patient location: [facility]    Anesthesia Type: MAC    Transport from OR: Transported from OR on room air with adequate spontaneous ventilation    Post pain: adequate analgesia    Post assessment: no apparent anesthetic complications and tolerated procedure well    Post vital signs: stable    Level of consciousness: alert, oriented and awake    Nausea/Vomiting: no nausea/vomiting    Complications: none    Transfer of care protocol was followed      Last vitals:   Visit Vitals  BP (!) 194/87 (BP Location: Left arm, Patient Position: Lying)   Pulse 60   Temp 36.9 °C (98.4 °F) (Oral)   Resp 18   Ht 6' 1" (1.854 m)   Wt 90.7 kg (200 lb)   SpO2 99%   BMI 26.39 kg/m²     "

## 2020-02-04 NOTE — DISCHARGE INSTRUCTIONS
Discharge Instructions for Cataract Surgery  A surgeon removed the cloudy lens in your eye and replaced it with a clear man-made lens. Be sure to have an adult family member or friend drive you home after surgery. Heres what you can expect following surgery and tips for a healthy recovery.  It is normal to have the following:  · Bruised or bloodshot eye for 7 days  · Itching and mild discomfort for several days  · Some fluid discharge  · Sensitivity to light  · Scratchy, sandlike feeling in the eye for 2 weeks  · Feeling tired, especially during the first 24 hours  Activity level  · Do not drive for 2 days or as instructed by your doctor.  · Do not drink alcohol for at least 24 hours.  · Avoid bending at the waist to  objects or lifting anything heavy for 2 days.  · Relax for the first 24 hours after surgery. Watching TV and reading are OK and wont harm your eye.  Eye protection  · Do not rub or press on your eye.  · Sleep on your back or on your unoperated side for 2 nights.  · If instructed, wear a bandage over your eye for 2 days and 2 nights.  · If instructed, wear a shield to protect your eye for 2 days and 2 nights.  Using eyedrops  You may be given special eyedrops or ointment. Here is one way to use eyedrops:  · Tilt your head back.  · Pull your bottom eyelid down.  · Squeeze one drop into your eye. Do not touch your eye with the bottle tip.  · Close your eyes for a few seconds.  · If you need more than one drop, wait a few minutes before adding the next one.   Call your doctor right away if you have any of the following:  · Bleeding or discharge from the eye  · Your vision suddenly becomes worse  · Pain medicine you are told to take does not ease your pain  · Nausea or vomiting  · Chills or fever over 100.4°F (39.1°C)   Date Last Reviewed: 5/30/2015  © 4126-6072 [employer]. [address] [place] [address], [place] [address]. All rights reserved. This information is not intended as a  substitute for professional medical care. Always follow your healthcare professional's instructions.

## 2020-02-04 NOTE — ANESTHESIA PREPROCEDURE EVALUATION
02/04/2020  Esteban Carson is a 65 y.o., male.    Past Medical History:   Diagnosis Date    Cataract     Emphysema (subcutaneous) (surgical) resulting from a procedure     Hyperlipidemia     ILD (interstitial lung disease)     Osteoarthritis        Anesthesia Evaluation         Review of Systems  Cardiovascular:   hyperlipidemia   Pulmonary:   Interstitial lung disease   Musculoskeletal:   Arthritis         Physical Exam  General:  Well nourished    Airway/Jaw/Neck:  Airway Findings: Mouth Opening: Normal Tongue: Normal  General Airway Assessment: Adult  Mallampati: II  Improves to II with phonation.  TM Distance: Normal, at least 6 cm        Eyes/Ears/Nose:  EYES/EARS/NOSE FINDINGS: Normal   Dental:  DENTAL FINDINGS: Normal   Chest/Lungs:  Chest/Lungs Clear    Heart/Vascular:  Heart Findings: Normal    Abdomen:  Abdomen Findings: Normal    Musculoskeletal:  Musculoskeletal Findings: Normal    Mental Status:  Mental Status Findings: Normal        Anesthesia Plan  Type of Anesthesia, risks & benefits discussed:  Anesthesia Type:  general  Patient's Preference:   Intra-op Monitoring Plan: standard ASA monitors  Intra-op Monitoring Plan Comments:   Post Op Pain Control Plan: IV/PO Opioids PRN  Post Op Pain Control Plan Comments:   Induction:   IV  Beta Blocker:  Patient is not currently on a Beta-Blocker (No further documentation required).       Informed Consent: Patient understands risks and agrees with Anesthesia plan.  Questions answered. Anesthesia consent signed with patient.  ASA Score: 2     Day of Surgery Review of History & Physical: I have interviewed and examined the patient. I have reviewed the patient's H&P dated:  There are no significant changes.          Ready For Surgery From Anesthesia Perspective.

## 2020-02-05 ENCOUNTER — OFFICE VISIT (OUTPATIENT)
Dept: OPHTHALMOLOGY | Facility: CLINIC | Age: 65
End: 2020-02-05
Payer: MEDICARE

## 2020-02-05 VITALS — SYSTOLIC BLOOD PRESSURE: 163 MMHG | HEART RATE: 57 BPM | DIASTOLIC BLOOD PRESSURE: 89 MMHG

## 2020-02-05 DIAGNOSIS — Z98.890 POST-OPERATIVE STATE: Primary | ICD-10-CM

## 2020-02-05 PROCEDURE — 99024 PR POST-OP FOLLOW-UP VISIT: ICD-10-PCS | Mod: POP,,, | Performed by: OPHTHALMOLOGY

## 2020-02-05 PROCEDURE — 99999 PR PBB SHADOW E&M-EST. PATIENT-LVL III: CPT | Mod: PBBFAC,,, | Performed by: OPHTHALMOLOGY

## 2020-02-05 PROCEDURE — 99213 OFFICE O/P EST LOW 20 MIN: CPT | Mod: PBBFAC,PO | Performed by: OPHTHALMOLOGY

## 2020-02-05 PROCEDURE — 99999 PR PBB SHADOW E&M-EST. PATIENT-LVL III: ICD-10-PCS | Mod: PBBFAC,,, | Performed by: OPHTHALMOLOGY

## 2020-02-05 PROCEDURE — 99024 POSTOP FOLLOW-UP VISIT: CPT | Mod: POP,,, | Performed by: OPHTHALMOLOGY

## 2020-02-06 NOTE — PROGRESS NOTES
Subjective:       Patient ID: Esteban Carson is a 65 y.o. male.    Chief Complaint: Post-op Evaluation (1 day PO OS. CE IOL 2/4/2020 OS. )    HPI     Post-op Evaluation      Additional comments: 1 day PO OS. CE IOL 2/4/2020 OS.               Comments     65 y.o. Male is here for 1 day PO OS. CE IOL 2/4/2020 OS. Denies eye pain   and f/f. No discomfort.     Eye meds: Ofloxacin QID OS                     Durezol QID OS                     Ilevro qd OS          Last edited by JOHAN Lomeli on 2/5/2020 12:18 PM. (History)             Assessment:       1. Post-operative state        Plan:       S/p CE OS with Toric IOL- Doing well.         CPM OS.  RTC 1 wk.

## 2020-02-12 ENCOUNTER — OFFICE VISIT (OUTPATIENT)
Dept: OPHTHALMOLOGY | Facility: CLINIC | Age: 65
End: 2020-02-12
Payer: MEDICARE

## 2020-02-12 DIAGNOSIS — H25.11 NS (NUCLEAR SCLEROSIS), RIGHT: ICD-10-CM

## 2020-02-12 DIAGNOSIS — Z98.890 POST-OPERATIVE STATE: Primary | ICD-10-CM

## 2020-02-12 PROCEDURE — 92136 OPHTHALMIC BIOMETRY: CPT | Mod: PBBFAC,PO | Performed by: OPHTHALMOLOGY

## 2020-02-12 PROCEDURE — 99024 POSTOP FOLLOW-UP VISIT: CPT | Mod: POP,,, | Performed by: OPHTHALMOLOGY

## 2020-02-12 PROCEDURE — 99024 PR POST-OP FOLLOW-UP VISIT: ICD-10-PCS | Mod: POP,,, | Performed by: OPHTHALMOLOGY

## 2020-02-12 PROCEDURE — 92136 PR OPHTHAL BIOMETRY,INTRAOC LENS POW CALC: ICD-10-PCS | Mod: 26,S$PBB,RT, | Performed by: OPHTHALMOLOGY

## 2020-02-12 PROCEDURE — 99999 PR PBB SHADOW E&M-EST. PATIENT-LVL II: CPT | Mod: PBBFAC,,, | Performed by: OPHTHALMOLOGY

## 2020-02-12 PROCEDURE — 92136 OPHTHALMIC BIOMETRY: CPT | Mod: 26,S$PBB,RT, | Performed by: OPHTHALMOLOGY

## 2020-02-12 PROCEDURE — 99999 PR PBB SHADOW E&M-EST. PATIENT-LVL II: ICD-10-PCS | Mod: PBBFAC,,, | Performed by: OPHTHALMOLOGY

## 2020-02-12 PROCEDURE — 99212 OFFICE O/P EST SF 10 MIN: CPT | Mod: PBBFAC,PO,25 | Performed by: OPHTHALMOLOGY

## 2020-02-12 NOTE — PROGRESS NOTES
Subjective:       Patient ID: Esteban Carson is a 65 y.o. male.    Chief Complaint: Post-op Evaluation    HPI     Patient here for 1 wk PO. Patient c/o a delay in tracking of left eye   while wearing old glasses and looking in different directions. Patient   denies any other complaints.    durezol gtts  Ofloxacin gtts D/C  ILEVRO 0.3 % DrpS  neomycin-polymyxin-dexamethasone (MAXITROL) 3.5mg/mL-10,000 unit/mL-0.1 %   DrpS    S/p Phacoemulsification of cataract + IOL (Left) - 2/4/2020  S/p left ptosis repair    1. Refractive error  2. Vitreous detachment OU  3. Nuclear sclerosis OU  4. Ptosis of left eyelid    Last edited by Maulik Land on 2/12/2020  9:41 AM. (History)             Assessment:       1. Post-operative state    2. NS (nuclear sclerosis), right        Plan:       S/p CE OS with Toric IOL- Doing well.     Visually significant cataract OD -Pt. Wants Sx.        Taper gtts OS.  Cataract Surgery Consent: Patient with a visually significant cataract with difficulties of ADLs, reading, driving, night vision, glare (any and all).  Discussed with Patient/Family/Caregiver: options, risks and benefits, expectations of cataract surgery, utilized an eye model with questions and answers to facilitate discussion.  Discussed lens options and patient understands that glasses may be required for optimal vision for distance and/or near vision after cataract surgery.  The Patient/Family/Caregiver  voice good understanding and patient wishes to proceed with surgery.  The patient will likely benefit from surgery and patient signed consent for Right Eye.  CE OD 3/3/2020.

## 2020-02-28 ENCOUNTER — TELEPHONE (OUTPATIENT)
Dept: OPHTHALMOLOGY | Facility: CLINIC | Age: 65
End: 2020-02-28

## 2020-02-29 NOTE — H&P
[facility]  History & Physical    Subjective:      Chief Complaint/Reason for Admission:     Esteban Carson is a 65 y.o. male.    Past Medical History:   Diagnosis Date    Cataract     Emphysema (subcutaneous) (surgical) resulting from a procedure     Hyperlipidemia     ILD (interstitial lung disease)     Osteoarthritis      Past Surgical History:   Procedure Laterality Date    CATARACT EXTRACTION W/  INTRAOCULAR LENS IMPLANT Left 2020    Dr. Perry    FACIAL COSMETIC SURGERY      INTRAOCULAR PROSTHESES INSERTION Left 2020    Procedure: INSERTION, IOL PROSTHESIS;  Surgeon: Harvey Perry MD;  Location: [facility] OR [address] [place] [address];  Service: Ophthalmology;  Laterality: Left;    left ptosis repair      PHACOEMULSIFICATION OF CATARACT Left 2020    Procedure: PHACOEMULSIFICATION, CATARACT;  Surgeon: Harvey Perry MD;  Location: [facility] OR [address] [place] [address];  Service: Ophthalmology;  Laterality: Left;    wrist ganglion cyst       Family History   Problem Relation Age of Onset    Heart disease Father     Heart disease Brother     Liver cancer Brother     Macular degeneration Mother     Blindness Mother     Glaucoma Maternal Grandmother     Esophageal cancer Brother      Social History     Tobacco Use    Smoking status: Former Smoker     Packs/day: 0.30     Years: 25.00     Pack years: 7.50     Types: Cigarettes     Last attempt to quit: 2014     Years since quittin.5    Smokeless tobacco: Never Used   Substance Use Topics    Alcohol use: No    Drug use: No       No medications prior to admission.     Review of patient's allergies indicates:  No Known Allergies     Review of Systems   Eyes: Positive for blurred vision.   All other systems reviewed and are negative.      Objective:      Vital Signs (Most Recent)       Vital Signs Range (Last 24H):       Physical Exam   Constitutional: He is oriented to person, place, and time. He appears well-developed and  well-nourished.   HENT:   Head: Normocephalic.   Eyes: Pupils are equal, round, and reactive to light. Conjunctivae and EOM are normal.   Neck: Normal range of motion. Neck supple.   Cardiovascular: Normal rate.   Pulmonary/Chest: Effort normal and breath sounds normal.   Abdominal: Soft. Bowel sounds are normal.   Musculoskeletal: Normal range of motion.   Neurological: He is alert and oriented to person, place, and time.   Skin: Skin is warm.   Psychiatric: He has a normal mood and affect.       Data Review:   ECG:     Assessment:      Cataract OD.    Plan:    CE OD.

## 2020-03-03 ENCOUNTER — HOSPITAL ENCOUNTER (OUTPATIENT)
Facility: HOSPITAL | Age: 65
Discharge: HOME | End: 2020-03-03
Attending: OPHTHALMOLOGY | Admitting: OPHTHALMOLOGY
Payer: MEDICARE

## 2020-03-03 ENCOUNTER — PATIENT MESSAGE (OUTPATIENT)
Dept: INTERNAL MEDICINE | Facility: CLINIC | Age: 65
End: 2020-03-03

## 2020-03-03 ENCOUNTER — ANESTHESIA (OUTPATIENT)
Dept: SURGERY | Facility: HOSPITAL | Age: 65
End: 2020-03-03
Payer: MEDICARE

## 2020-03-03 ENCOUNTER — ANESTHESIA EVENT (OUTPATIENT)
Dept: SURGERY | Facility: HOSPITAL | Age: 65
End: 2020-03-03
Payer: MEDICARE

## 2020-03-03 VITALS
WEIGHT: 200 LBS | HEART RATE: 72 BPM | OXYGEN SATURATION: 99 % | TEMPERATURE: 98 F | HEIGHT: 73 IN | RESPIRATION RATE: 20 BRPM | SYSTOLIC BLOOD PRESSURE: 180 MMHG | BODY MASS INDEX: 26.51 KG/M2 | DIASTOLIC BLOOD PRESSURE: 82 MMHG

## 2020-03-03 DIAGNOSIS — H25.10 SENILE NUCLEAR SCLEROSIS: ICD-10-CM

## 2020-03-03 PROCEDURE — V2632 POST CHMBR INTRAOCULAR LENS: HCPCS | Performed by: OPHTHALMOLOGY

## 2020-03-03 PROCEDURE — 63600175 PHARM REV CODE 636 W HCPCS: Performed by: OPHTHALMOLOGY

## 2020-03-03 PROCEDURE — D9220A PRA ANESTHESIA: ICD-10-PCS | Mod: ANES,,, | Performed by: ANESTHESIOLOGY

## 2020-03-03 PROCEDURE — 25000003 PHARM REV CODE 250: Performed by: OPHTHALMOLOGY

## 2020-03-03 PROCEDURE — 66984 XCAPSL CTRC RMVL W/O ECP: CPT | Mod: 79,RT,, | Performed by: OPHTHALMOLOGY

## 2020-03-03 PROCEDURE — 71000015 HC POSTOP RECOV 1ST HR: Performed by: OPHTHALMOLOGY

## 2020-03-03 PROCEDURE — D9220A PRA ANESTHESIA: Mod: CRNA,,, | Performed by: NURSE ANESTHETIST, CERTIFIED REGISTERED

## 2020-03-03 PROCEDURE — 25000003 PHARM REV CODE 250

## 2020-03-03 PROCEDURE — 37000008 HC ANESTHESIA 1ST 15 MINUTES: Performed by: OPHTHALMOLOGY

## 2020-03-03 PROCEDURE — D9220A PRA ANESTHESIA: ICD-10-PCS | Mod: CRNA,,, | Performed by: NURSE ANESTHETIST, CERTIFIED REGISTERED

## 2020-03-03 PROCEDURE — 71000044 HC DOSC ROUTINE RECOVERY FIRST HOUR: Performed by: OPHTHALMOLOGY

## 2020-03-03 PROCEDURE — D9220A PRA ANESTHESIA: Mod: ANES,,, | Performed by: ANESTHESIOLOGY

## 2020-03-03 PROCEDURE — 66984 PR REMOVAL, CATARACT, W/INSRT INTRAOC LENS, W/O ENDO CYCLO: ICD-10-PCS | Mod: 79,RT,, | Performed by: OPHTHALMOLOGY

## 2020-03-03 PROCEDURE — 36000706: Performed by: OPHTHALMOLOGY

## 2020-03-03 PROCEDURE — 63600175 PHARM REV CODE 636 W HCPCS: Performed by: NURSE ANESTHETIST, CERTIFIED REGISTERED

## 2020-03-03 PROCEDURE — 36000707: Performed by: OPHTHALMOLOGY

## 2020-03-03 PROCEDURE — 37000009 HC ANESTHESIA EA ADD 15 MINS: Performed by: OPHTHALMOLOGY

## 2020-03-03 DEVICE — LENS 17.0: Type: IMPLANTABLE DEVICE | Site: EYE | Status: FUNCTIONAL

## 2020-03-03 RX ORDER — LIDOCAINE HYDROCHLORIDE 40 MG/ML
INJECTION, SOLUTION RETROBULBAR
Status: DISCONTINUED
Start: 2020-03-03 | End: 2020-03-03 | Stop reason: HOSPADM

## 2020-03-03 RX ORDER — MIDAZOLAM HYDROCHLORIDE 1 MG/ML
INJECTION, SOLUTION INTRAMUSCULAR; INTRAVENOUS
Status: DISCONTINUED | OUTPATIENT
Start: 2020-03-03 | End: 2020-03-03

## 2020-03-03 RX ORDER — PHENYLEPHRINE HYDROCHLORIDE 25 MG/ML
1 SOLUTION/ DROPS OPHTHALMIC
Status: ACTIVE | OUTPATIENT
Start: 2020-03-03

## 2020-03-03 RX ORDER — LIDOCAINE HYDROCHLORIDE 40 MG/ML
INJECTION, SOLUTION RETROBULBAR
Status: DISCONTINUED | OUTPATIENT
Start: 2020-03-03 | End: 2020-03-03 | Stop reason: HOSPADM

## 2020-03-03 RX ORDER — CYCLOPENTOLATE HYDROCHLORIDE 10 MG/ML
SOLUTION/ DROPS OPHTHALMIC
Status: COMPLETED
Start: 2020-03-03 | End: 2020-03-03

## 2020-03-03 RX ORDER — PREDNISOLONE ACETATE 10 MG/ML
SUSPENSION/ DROPS OPHTHALMIC
Status: DISCONTINUED | OUTPATIENT
Start: 2020-03-03 | End: 2020-03-03 | Stop reason: HOSPADM

## 2020-03-03 RX ORDER — OFLOXACIN 3 MG/ML
SOLUTION/ DROPS OPHTHALMIC
Status: COMPLETED
Start: 2020-03-03 | End: 2020-03-03

## 2020-03-03 RX ORDER — TROPICAMIDE 10 MG/ML
1 SOLUTION/ DROPS OPHTHALMIC
Status: ACTIVE | OUTPATIENT
Start: 2020-03-03

## 2020-03-03 RX ORDER — CYCLOPENTOLATE HYDROCHLORIDE 10 MG/ML
1 SOLUTION/ DROPS OPHTHALMIC
Status: COMPLETED | OUTPATIENT
Start: 2020-03-03 | End: 2020-03-03

## 2020-03-03 RX ORDER — PREDNISOLONE ACETATE 10 MG/ML
SUSPENSION/ DROPS OPHTHALMIC
Status: DISCONTINUED
Start: 2020-03-03 | End: 2020-03-03 | Stop reason: HOSPADM

## 2020-03-03 RX ORDER — ACETAMINOPHEN 325 MG/1
650 TABLET ORAL EVERY 4 HOURS PRN
Status: DISCONTINUED | OUTPATIENT
Start: 2020-03-03 | End: 2020-03-03 | Stop reason: HOSPADM

## 2020-03-03 RX ORDER — PROPARACAINE HYDROCHLORIDE 5 MG/ML
1 SOLUTION/ DROPS OPHTHALMIC
Status: ACTIVE | OUTPATIENT
Start: 2020-03-03

## 2020-03-03 RX ORDER — SODIUM CHLORIDE 9 MG/ML
INJECTION, SOLUTION INTRAVENOUS CONTINUOUS
Status: ACTIVE | OUTPATIENT
Start: 2020-03-03

## 2020-03-03 RX ORDER — SODIUM CHLORIDE 0.9 % (FLUSH) 0.9 %
10 SYRINGE (ML) INJECTION
Status: DISCONTINUED | OUTPATIENT
Start: 2020-03-03 | End: 2020-03-03 | Stop reason: HOSPADM

## 2020-03-03 RX ORDER — PHENYLEPHRINE HYDROCHLORIDE 25 MG/ML
SOLUTION/ DROPS OPHTHALMIC
Status: COMPLETED
Start: 2020-03-03 | End: 2020-03-03

## 2020-03-03 RX ORDER — HYDROCODONE BITARTRATE AND ACETAMINOPHEN 5; 325 MG/1; MG/1
1 TABLET ORAL EVERY 4 HOURS PRN
Status: DISCONTINUED | OUTPATIENT
Start: 2020-03-03 | End: 2020-03-03 | Stop reason: HOSPADM

## 2020-03-03 RX ORDER — OFLOXACIN 3 MG/ML
SOLUTION/ DROPS OPHTHALMIC
Status: DISCONTINUED | OUTPATIENT
Start: 2020-03-03 | End: 2020-03-03 | Stop reason: HOSPADM

## 2020-03-03 RX ORDER — TROPICAMIDE 10 MG/ML
SOLUTION/ DROPS OPHTHALMIC
Status: COMPLETED
Start: 2020-03-03 | End: 2020-03-03

## 2020-03-03 RX ORDER — PROPARACAINE HYDROCHLORIDE 5 MG/ML
SOLUTION/ DROPS OPHTHALMIC
Status: COMPLETED
Start: 2020-03-03 | End: 2020-03-03

## 2020-03-03 RX ORDER — OFLOXACIN 3 MG/ML
1 SOLUTION/ DROPS OPHTHALMIC
Status: COMPLETED | OUTPATIENT
Start: 2020-03-03 | End: 2020-03-03

## 2020-03-03 RX ORDER — ACETAMINOPHEN 325 MG/1
TABLET ORAL
Status: DISCONTINUED
Start: 2020-03-03 | End: 2020-03-03 | Stop reason: HOSPADM

## 2020-03-03 RX ADMIN — OFLOXACIN 1 DROP: 3 SOLUTION/ DROPS OPHTHALMIC at 01:03

## 2020-03-03 RX ADMIN — PROPARACAINE HYDROCHLORIDE 1 DROP: 5 SOLUTION/ DROPS OPHTHALMIC at 01:03

## 2020-03-03 RX ADMIN — CYCLOPENTOLATE HYDROCHLORIDE 1 DROP: 10 SOLUTION/ DROPS OPHTHALMIC at 01:03

## 2020-03-03 RX ADMIN — MIDAZOLAM HYDROCHLORIDE 2 MG: 1 INJECTION, SOLUTION INTRAMUSCULAR; INTRAVENOUS at 03:03

## 2020-03-03 RX ADMIN — PHENYLEPHRINE HYDROCHLORIDE 1 DROP: 25 SOLUTION/ DROPS OPHTHALMIC at 01:03

## 2020-03-03 RX ADMIN — TROPICAMIDE 1 DROP: 10 SOLUTION/ DROPS OPHTHALMIC at 01:03

## 2020-03-03 RX ADMIN — PHENYLEPHRINE HYDROCHLORIDE 1 DROP: 2.5 SOLUTION/ DROPS OPHTHALMIC at 01:03

## 2020-03-03 RX ADMIN — OFLOXACIN 1 DROP: 3 SOLUTION OPHTHALMIC at 01:03

## 2020-03-03 RX ADMIN — SODIUM CHLORIDE 1000 ML: 0.9 INJECTION, SOLUTION INTRAVENOUS at 01:03

## 2020-03-03 RX ADMIN — ACETAMINOPHEN 650 MG: 325 TABLET ORAL at 04:03

## 2020-03-03 NOTE — BRIEF OP NOTE
[facility]  Brief Operative Note    Surgery Date: 3/3/2020     Surgeon(s) and Role:     * Harvey Perry MD - Primary    Assisting Surgeon: None    Pre-op Diagnosis:  NS (nuclear sclerosis), right [H25.11]    Post-op Diagnosis:  Post-Op Diagnosis Codes:     * NS (nuclear sclerosis), right [H25.11]    Procedure(s) (LRB):  PHACOEMULSIFICATION, CATARACT (Right)  INSERTION, IOL PROSTHESIS (Right)    Anesthesia: Local MAC    Description of the findings of the procedure(s):     Estimated Blood Loss: * Case end time is documented earlier than case start time so the value cannot be calculated. *         Specimens:   Specimen (12h ago, onward)    None            Discharge Note    OUTCOME: Patient tolerated treatment/procedure well without complication and is now ready for discharge.    DISPOSITION: Home or Self Care    FINAL DIAGNOSIS:  Senile nuclear sclerosis    FOLLOWUP: In clinic    DISCHARGE INSTRUCTIONS:    Discharge Procedure Orders   Other restrictions (specify):   Order Comments: No heavy lifting or bending for 1 week.

## 2020-03-03 NOTE — OP NOTE
Operative Date:  03/03/2020    Discharge Date:  03/03/2020    Discharge Patient Home    Report Title: Operative Note      SURGEON: Harvey Perry MD     ASSISTANT: None    PREOPERATIVE DIAGNOSIS: Visually significant NSC cataract,  Right Eye.    POSTOPERATIVE DIAGNOSIS: Visually-significant NSC cataract,  Right Eye.    PROCEDURE PERFORMED: Phacoemulsification of the cataract with posterior chamber intraocular lens Right Eye.    ANESTHESIA: Topical with MAC    COMPLICATIONS: None    ESTIMATED BLOOD LOSS: Minimal    INDICATIONS FOR PROCEDURE:   The patient is a pleasant 65 year old gentleman with increasing difficulties with activities of daily living secondary to a dense visually significant cataract in the Right Eye.  Discussions have been carried out with this patient concerning the options to surgery, risks, benefits and expectations.  The patient voiced good understanding and wished to proceed with the above procedure.    PROCEDURE IN DETAIL: The patient was brought to the operating room and received topical anesthetic to the eye and then was prepped and draped in the usual sterile fashion.  Using the Lopez ring and the guarded yuliana blade set at 0.37 mm, a partial thickness clear cornea incision was made temporally.  The paracentesis site was made at the twelve o'clock position.  Omidria was injected into the anterior chamber through the paracentesis.  Viscoat was then injected into the anterior chamber.  The eye was then reentered at the primary surgical site with a 2.4 mm keratome followed by continuous capsulotomy, hydrodissection, hydrodelineation and phacoemulsification of the cataract.  Residual cortical material was removed using automated irrigation-aspiration technique.  Healon was injected into the posterior chamber and an [id number] 17.0 diopter lens was placed in the bag without difficulty. Residual viscoelastic was removed using automated irrigation-aspiration technique. The eye was  re-pressurized using BSS solution and both the paracentesis site and the primary surgical site were demonstrated to be watertight at the end of the case with Weck--Stephanie manipulation.  One drop of Ofloxacin and one drop of Pred acetate 1% was applied to the Right Eye .The eye was closed, patched and a Ramos shield placed.  The patient was taken to the recovery room in good and stable condition.  The patient tolerated the procedure well.  The patient was instructed to refrain from any heavy lifting, bending, stooping or straining activities, discharged home  and to follow-up in the morning for routine postoperative care with Harvey Perry MD.

## 2020-03-03 NOTE — ANESTHESIA PREPROCEDURE EVALUATION
03/03/2020  Esteban Carson is a 65 y.o., male.    Anesthesia Evaluation    I have reviewed the Patient Summary Reports.     I have reviewed the Medications.     Review of Systems  Anesthesia Hx:  Denies Hx of Anesthetic complications  History of prior surgery of interest to airway management or planning: Denies Family Hx of Anesthesia complications.   Denies Personal Hx of Anesthesia complications.   Social:  Former Smoker, No Alcohol Use    Hematology/Oncology:  Hematology Normal   Oncology Normal     EENT/Dental:EENT/Dental Normal   Cardiovascular:  Cardiovascular Normal  ECG has been reviewed.    Pulmonary:   COPD    Renal/:  Renal/ Normal     Hepatic/GI:  Hepatic/GI Normal    Musculoskeletal:   Arthritis     Neurological:  Neurology Normal    Endocrine:  Endocrine Normal    Dermatological:  Skin Normal    Psych:  Psychiatric Normal           Physical Exam  General:  Well nourished    Airway/Jaw/Neck:  Airway Findings: Mouth Opening: Normal Tongue: Normal  General Airway Assessment: Adult  Mallampati: II  Improves to II with phonation.      Dental:  Dental Findings: In tact   Chest/Lungs:  Chest/Lungs Findings: Clear to auscultation, Normal Respiratory Rate     Heart/Vascular:  Heart Findings: Rate: Normal  Rhythm: Regular Rhythm     Abdomen:  Abdomen Findings:  Soft, Normal, Nontender       Mental Status:  Mental Status Findings:  Cooperative, Alert and Oriented         Anesthesia Plan  Type of Anesthesia, risks & benefits discussed:  Anesthesia Type:  MAC  Patient's Preference:   Intra-op Monitoring Plan: standard ASA monitors  Intra-op Monitoring Plan Comments:   Post Op Pain Control Plan: multimodal analgesia  Post Op Pain Control Plan Comments:   Induction:   IV  Beta Blocker:  Patient is not currently on a Beta-Blocker (No further documentation required).       Informed Consent: Patient  understands risks and agrees with Anesthesia plan.  Questions answered. Anesthesia consent signed with patient.  ASA Score: 2     Day of Surgery Review of History & Physical:    H&P update referred to the surgeon.         Ready For Surgery From Anesthesia Perspective.

## 2020-03-03 NOTE — TRANSFER OF CARE
"Anesthesia Transfer of Care Note    Patient: Esteban Carson    Procedure(s) Performed: Procedure(s) (LRB):  PHACOEMULSIFICATION, CATARACT (Right)  INSERTION, IOL PROSTHESIS (Right)    Patient location: PACU    Anesthesia Type: MAC    Transport from OR: Transported from OR on room air with adequate spontaneous ventilation    Post pain: adequate analgesia    Post assessment: no apparent anesthetic complications and tolerated procedure well    Post vital signs: stable    Level of consciousness: awake, alert and oriented    Nausea/Vomiting: no nausea/vomiting    Complications: none    Transfer of care protocol was followed      Last vitals:   Visit Vitals  BP (!) 172/86   Pulse 63   Temp 37.2 °C (99 °F) (Temporal)   Resp 16   Ht 6' 1" (1.854 m)   Wt 90.7 kg (200 lb)   SpO2 98%   BMI 26.39 kg/m²     "

## 2020-03-03 NOTE — DISCHARGE INSTRUCTIONS
Cataract Treatment: Removing the Cloudy Lens  A cataract is a clouding of your eye's lens. Surgery can be done to remove the cataract and replace the lens. This can help you see better. The 2 most common ways to remove a cataract are phacoemulsification and extracapsular extraction.     Phacoemulsification       Extracapsular extraction      Phacoemulsification  A small cut (incision) is made in the cornea. This is the clear cover on the front of your eye. Or a cut may be made in the white part of your eye (sclera). Then a hole is made in the anterior capsule. This is the space between your cornea and the colored part of your eye (iris). A tiny ultrasound probe is inserted into the lens. Sound vibrations from the probe break the cloudy lens into tiny pieces. The pieces are then suctioned out. Stitches may be needed after this is done.  Extracapsular extraction  This method makes a larger cut in the cornea. The entire lens can then be removed at once through the cut. Stitches are used to close the cut after the procedure.  Implanting an IOL  After the cloudy lens is removed, it can be replaced with a plastic lens called an IOL (intraocular lens).    Date Last Reviewed: 6/15/2015  © 2325-5111 [employer] [address] [place] [address], [place] [address]. All rights reserved. This information is not intended as a substitute for professional medical care. Always follow your healthcare professional's instructions.

## 2020-03-03 NOTE — PLAN OF CARE
Discharge instructions given to and explained to patient and his wife. All questions answered and pt and family member verbalized understanding. IV discontinued with cannula intact. Vital signs stable and pt AOx4.

## 2020-03-04 ENCOUNTER — OFFICE VISIT (OUTPATIENT)
Dept: INTERNAL MEDICINE | Facility: CLINIC | Age: 65
End: 2020-03-04
Payer: MEDICARE

## 2020-03-04 ENCOUNTER — OFFICE VISIT (OUTPATIENT)
Dept: OPHTHALMOLOGY | Facility: CLINIC | Age: 65
End: 2020-03-04
Payer: MEDICARE

## 2020-03-04 ENCOUNTER — TELEPHONE (OUTPATIENT)
Dept: INTERNAL MEDICINE | Facility: CLINIC | Age: 65
End: 2020-03-04

## 2020-03-04 VITALS
WEIGHT: 207.25 LBS | RESPIRATION RATE: 16 BRPM | HEIGHT: 73 IN | DIASTOLIC BLOOD PRESSURE: 88 MMHG | TEMPERATURE: 98 F | BODY MASS INDEX: 27.47 KG/M2 | HEART RATE: 60 BPM | SYSTOLIC BLOOD PRESSURE: 164 MMHG

## 2020-03-04 VITALS — SYSTOLIC BLOOD PRESSURE: 151 MMHG | DIASTOLIC BLOOD PRESSURE: 92 MMHG | HEART RATE: 57 BPM

## 2020-03-04 DIAGNOSIS — I10 ESSENTIAL HYPERTENSION: ICD-10-CM

## 2020-03-04 DIAGNOSIS — I10 ESSENTIAL HYPERTENSION: Primary | ICD-10-CM

## 2020-03-04 DIAGNOSIS — J84.10 PULMONARY FIBROSIS: ICD-10-CM

## 2020-03-04 DIAGNOSIS — Z12.5 SCREENING PSA (PROSTATE SPECIFIC ANTIGEN): ICD-10-CM

## 2020-03-04 DIAGNOSIS — Z98.890 POST-OPERATIVE STATE: Primary | ICD-10-CM

## 2020-03-04 DIAGNOSIS — E78.5 DYSLIPIDEMIA: Primary | ICD-10-CM

## 2020-03-04 PROCEDURE — 99213 OFFICE O/P EST LOW 20 MIN: CPT | Mod: PBBFAC,27,PO | Performed by: OPHTHALMOLOGY

## 2020-03-04 PROCEDURE — 99999 PR PBB SHADOW E&M-EST. PATIENT-LVL III: ICD-10-PCS | Mod: PBBFAC,,, | Performed by: OPHTHALMOLOGY

## 2020-03-04 PROCEDURE — 99214 PR OFFICE/OUTPT VISIT, EST, LEVL IV, 30-39 MIN: ICD-10-PCS | Mod: S$PBB,,, | Performed by: FAMILY MEDICINE

## 2020-03-04 PROCEDURE — 99024 POSTOP FOLLOW-UP VISIT: CPT | Mod: POP,,, | Performed by: OPHTHALMOLOGY

## 2020-03-04 PROCEDURE — 99214 OFFICE O/P EST MOD 30 MIN: CPT | Mod: S$PBB,,, | Performed by: FAMILY MEDICINE

## 2020-03-04 PROCEDURE — 99024 PR POST-OP FOLLOW-UP VISIT: ICD-10-PCS | Mod: POP,,, | Performed by: OPHTHALMOLOGY

## 2020-03-04 PROCEDURE — 99999 PR PBB SHADOW E&M-EST. PATIENT-LVL IV: ICD-10-PCS | Mod: PBBFAC,,, | Performed by: FAMILY MEDICINE

## 2020-03-04 PROCEDURE — 99214 OFFICE O/P EST MOD 30 MIN: CPT | Mod: PBBFAC,PO | Performed by: FAMILY MEDICINE

## 2020-03-04 PROCEDURE — 99999 PR PBB SHADOW E&M-EST. PATIENT-LVL III: CPT | Mod: PBBFAC,,, | Performed by: OPHTHALMOLOGY

## 2020-03-04 PROCEDURE — 99999 PR PBB SHADOW E&M-EST. PATIENT-LVL IV: CPT | Mod: PBBFAC,,, | Performed by: FAMILY MEDICINE

## 2020-03-04 RX ORDER — OFLOXACIN 3 MG/ML
1 SOLUTION/ DROPS OPHTHALMIC
COMMUNITY
Start: 2020-03-02 | End: 2020-03-10

## 2020-03-04 RX ORDER — LOSARTAN POTASSIUM 25 MG/1
25 TABLET ORAL DAILY
Qty: 30 TABLET | Refills: 11 | Status: SHIPPED | OUTPATIENT
Start: 2020-03-04 | End: 2020-03-23 | Stop reason: SDUPTHER

## 2020-03-04 NOTE — PROGRESS NOTES
Subjective:       Patient ID: Esteban Carson is a 65 y.o. male.    Chief Complaint: Post-op Evaluation (1 day po od)    HPI     Post-op Evaluation      Additional comments: 1 day po od              Comments     S/p Phaco w/IOL OD- 3/3/2020    64 y/o male is here for 1 day po od. Pt states VA is blurry OD. Denies   pain and discomfort.     Eyemeds  Ofloxacin QID OD  Ilevro QD OD  Durezol QID OD             Last edited by Giovana Burnett on 3/4/2020 11:37 AM. (History)             Assessment:       1. Post-operative state        Plan:       S/p CE OU- Doing well.        CPM OD.  Taper gtts OS.  RTC 1 wk.

## 2020-03-04 NOTE — PROGRESS NOTES
Subjective:       Patient ID: Esteban Carson is a 65 y.o. male.    Chief Complaint: Blood Pressure Check (Pt state Ofev possibly causing HTN)    HPI 65-year-old white male with pulmonary fibrosis followed by pulmonology at [facility] presents to clinic today secondary to concerns of elevated blood pressure over the past month.  He has noted over the past month his blood pressure gradually elevating.  He reports recently having cataract surgery and on both occasions his blood pressure was noted to be elevated with his highest reading at 180/100.  It has been recommended that he follow-up for further evaluation.  He denies any symptoms such as headaches, dizziness, chest pain, shortness of breath, or lightheadedness.  Review of Systems   Constitutional: Negative for appetite change, chills, fatigue and fever.   HENT: Negative for congestion, ear pain, hearing loss, postnasal drip, rhinorrhea, sinus pressure, sore throat and tinnitus.    Eyes: Negative for redness, itching and visual disturbance.   Respiratory: Negative for cough, chest tightness and shortness of breath.    Cardiovascular: Negative for chest pain and palpitations.   Gastrointestinal: Negative for abdominal pain, constipation, diarrhea, nausea and vomiting.   Genitourinary: Negative for decreased urine volume, difficulty urinating, dysuria, frequency, hematuria and urgency.   Musculoskeletal: Negative for back pain, myalgias, neck pain and neck stiffness.   Skin: Negative for rash.   Neurological: Negative for dizziness, light-headedness and headaches.   Psychiatric/Behavioral: Negative.        Objective:      Physical Exam   Constitutional: He is oriented to person, place, and time. He appears well-developed and well-nourished. No distress.   HENT:   Head: Normocephalic and atraumatic.   Right Ear: External ear normal.   Left Ear: External ear normal.   Nose: Nose normal.   Mouth/Throat: Oropharynx is clear and moist. No oropharyngeal exudate.   Eyes:  Pupils are equal, round, and reactive to light. Conjunctivae and EOM are normal. Right eye exhibits no discharge. Left eye exhibits no discharge. No scleral icterus.   Neck: Normal range of motion. Neck supple. No JVD present. No tracheal deviation present. No thyromegaly present.   Cardiovascular: Normal rate, regular rhythm, normal heart sounds and intact distal pulses. Exam reveals no gallop and no friction rub.   No murmur heard.  Pulmonary/Chest: Effort normal and breath sounds normal. No stridor. No respiratory distress. He has no wheezes. He has no rales.   Abdominal: Soft. Bowel sounds are normal. He exhibits no distension and no mass. There is no tenderness. There is no rebound and no guarding.   Musculoskeletal: Normal range of motion. He exhibits no edema or tenderness.   Lymphadenopathy:     He has no cervical adenopathy.   Neurological: He is alert and oriented to person, place, and time.   Skin: Skin is warm and dry. No rash noted. He is not diaphoretic. No erythema. No pallor.   Psychiatric: He has a normal mood and affect. His behavior is normal. Judgment and thought content normal.   Nursing note and vitals reviewed.      Assessment:       1. Essential hypertension    2. Pulmonary fibrosis        Plan:       1.  Start losartan 25 mg daily.  2.  Continue follow-up with pulmonology as scheduled.  Pulmonary fibrosis is stable.  3.  Return to clinic as needed or as previously scheduled with PCP for annual exam.

## 2020-03-11 ENCOUNTER — OFFICE VISIT (OUTPATIENT)
Dept: OPHTHALMOLOGY | Facility: CLINIC | Age: 65
End: 2020-03-11
Payer: MEDICARE

## 2020-03-11 DIAGNOSIS — Z98.890 POST-OPERATIVE STATE: Primary | ICD-10-CM

## 2020-03-11 PROCEDURE — 99999 PR PBB SHADOW E&M-EST. PATIENT-LVL II: ICD-10-PCS | Mod: PBBFAC,,, | Performed by: OPHTHALMOLOGY

## 2020-03-11 PROCEDURE — 99212 OFFICE O/P EST SF 10 MIN: CPT | Mod: PBBFAC,PO | Performed by: OPHTHALMOLOGY

## 2020-03-11 PROCEDURE — 99024 PR POST-OP FOLLOW-UP VISIT: ICD-10-PCS | Mod: POP,,, | Performed by: OPHTHALMOLOGY

## 2020-03-11 PROCEDURE — 99024 POSTOP FOLLOW-UP VISIT: CPT | Mod: POP,,, | Performed by: OPHTHALMOLOGY

## 2020-03-11 PROCEDURE — 99999 PR PBB SHADOW E&M-EST. PATIENT-LVL II: CPT | Mod: PBBFAC,,, | Performed by: OPHTHALMOLOGY

## 2020-03-11 NOTE — PROGRESS NOTES
Subjective:       Patient ID: Esteban Carson is a 65 y.o. male.    Chief Complaint: Post-op Evaluation    HPI     DLS: 3/4/2020    S/p Phaco w/IOL OD- 3/3/2020    64 y/o male is here for 1 week po od. Pt states VA is improving. Denies   pain and discomfort.     Eyemeds    Ilevro QD OD  Durezol BID OD       Last edited by Shey Mckinnon on 3/11/2020 10:51 AM. (History)             Assessment:       1. Post-operative state        Plan:       S/p CE OU- Doing well.        Taper gtts OU.  RTC 3 wks.

## 2020-03-22 ENCOUNTER — PATIENT MESSAGE (OUTPATIENT)
Dept: INTERNAL MEDICINE | Facility: CLINIC | Age: 65
End: 2020-03-22

## 2020-03-22 DIAGNOSIS — I10 ESSENTIAL HYPERTENSION: ICD-10-CM

## 2020-03-23 RX ORDER — LOSARTAN POTASSIUM 25 MG/1
25 TABLET ORAL DAILY
Qty: 90 TABLET | Refills: 3 | Status: SHIPPED | OUTPATIENT
Start: 2020-03-23 | End: 2020-12-17

## 2020-04-13 ENCOUNTER — TELEPHONE (OUTPATIENT)
Dept: INTERNAL MEDICINE | Facility: CLINIC | Age: 65
End: 2020-04-13

## 2020-04-13 DIAGNOSIS — E78.5 DYSLIPIDEMIA: Primary | ICD-10-CM

## 2020-04-13 DIAGNOSIS — Z12.5 SCREENING PSA (PROSTATE SPECIFIC ANTIGEN): ICD-10-CM

## 2020-04-13 DIAGNOSIS — I10 ESSENTIAL HYPERTENSION: ICD-10-CM

## 2020-05-28 ENCOUNTER — PATIENT MESSAGE (OUTPATIENT)
Dept: OPHTHALMOLOGY | Facility: CLINIC | Age: 65
End: 2020-05-28

## 2020-06-17 ENCOUNTER — OFFICE VISIT (OUTPATIENT)
Dept: OPHTHALMOLOGY | Facility: CLINIC | Age: 65
End: 2020-06-17
Payer: MEDICARE

## 2020-06-17 DIAGNOSIS — H52.7 REFRACTIVE ERROR: ICD-10-CM

## 2020-06-17 DIAGNOSIS — Z98.890 POST-OPERATIVE STATE: Primary | ICD-10-CM

## 2020-06-17 PROCEDURE — 99024 POSTOP FOLLOW-UP VISIT: CPT | Mod: POP,,, | Performed by: OPHTHALMOLOGY

## 2020-06-17 PROCEDURE — 99024 PR POST-OP FOLLOW-UP VISIT: ICD-10-PCS | Mod: POP,,, | Performed by: OPHTHALMOLOGY

## 2020-06-17 PROCEDURE — 99999 PR PBB SHADOW E&M-EST. PATIENT-LVL III: CPT | Mod: PBBFAC,,, | Performed by: OPHTHALMOLOGY

## 2020-06-17 PROCEDURE — 99213 OFFICE O/P EST LOW 20 MIN: CPT | Mod: PBBFAC,PO | Performed by: OPHTHALMOLOGY

## 2020-06-17 PROCEDURE — 99999 PR PBB SHADOW E&M-EST. PATIENT-LVL III: ICD-10-PCS | Mod: PBBFAC,,, | Performed by: OPHTHALMOLOGY

## 2020-06-17 NOTE — PROGRESS NOTES
Subjective:       Patient ID: Esteban Carson is a 65 y.o. male.    Chief Complaint: Post-op Evaluation (3 week PO OU )    HPI     Post-op Evaluation      Additional comments: 3 week PO OU               Comments     65 y.o. male is here for 3 week PO OU. Denies eye pain and f/f. No   discomfort. No blurred vision at distance. Wears otc readers for reading.     Eye Med's: No gtt          Last edited by Harvey Perry MD on 6/17/2020 11:20 AM. (History)               Assessment:       1. Post-operative state    2. Refractive error        Plan:       S/p CE OU- Doing well.  RE-Doing well with readers.      Readers.  RTC Dr Gayle in 6 mos.

## 2020-09-14 ENCOUNTER — TELEPHONE (OUTPATIENT)
Dept: INTERNAL MEDICINE | Facility: CLINIC | Age: 65
End: 2020-09-14

## 2020-09-14 DIAGNOSIS — H52.7 REFRACTIVE ERROR: ICD-10-CM

## 2020-09-14 DIAGNOSIS — E78.5 DYSLIPIDEMIA: ICD-10-CM

## 2020-09-14 DIAGNOSIS — Z00.00 PREVENTATIVE HEALTH CARE: Primary | ICD-10-CM

## 2020-09-14 DIAGNOSIS — H25.11 NS (NUCLEAR SCLEROSIS), RIGHT: ICD-10-CM

## 2020-09-14 DIAGNOSIS — Z98.890 POST-OPERATIVE STATE: ICD-10-CM

## 2020-09-14 DIAGNOSIS — I10 ESSENTIAL HYPERTENSION: ICD-10-CM

## 2020-09-14 DIAGNOSIS — E55.9 VITAMIN D DEFICIENCY: ICD-10-CM

## 2020-09-14 DIAGNOSIS — H25.12 NUCLEAR SENILE CATARACT OF LEFT EYE: ICD-10-CM

## 2020-09-14 DIAGNOSIS — Z12.5 PROSTATE CANCER SCREENING: ICD-10-CM

## 2020-09-14 DIAGNOSIS — R91.8 ABNORMAL CT SCAN OF LUNG: ICD-10-CM

## 2020-09-14 DIAGNOSIS — J84.10 PULMONARY FIBROSIS: ICD-10-CM

## 2020-09-14 DIAGNOSIS — H02.402 PTOSIS OF LEFT EYELID: ICD-10-CM

## 2020-09-14 DIAGNOSIS — H43.813 VITREOUS DETACHMENT OF BOTH EYES: ICD-10-CM

## 2020-09-14 NOTE — TELEPHONE ENCOUNTER
Does patient have a temperature equal or greater than 100.4?    Temperature is * F.     Is the patient taking any fever reducing medications?    Pain Scale (0-10): *    Location: *       Lower or Upper: *      Right or Left: *    Sharp or stabbing pain *    How long? : *    Has patient been seen before for this symptom? *    If pain scale is equal to or greater than an eight (8), patients should speak to RN, forward message to RN.    If not sending to RN, create a virtual visit if appropriateDoes patient have a temperature equal or greater than 100.4?    Temperature is * F.     Is the patient taking any fever reducing medications?    Pain Scale (0-10): *    Location: *       Lower or Upper: *      Right or Left: *    Sharp or stabbing pain *    How long? : *    Has patient been seen before for this symptom? *    If pain scale is equal to or greater than an eight (8), patients should speak to RN, forward message to RN.    If not sending to RN, create a virtual visit if appropriatePlease order labs to be linked to annual physical appt

## 2020-10-01 ENCOUNTER — PATIENT MESSAGE (OUTPATIENT)
Dept: OTHER | Facility: OTHER | Age: 65
End: 2020-10-01

## 2020-10-01 ENCOUNTER — PATIENT MESSAGE (OUTPATIENT)
Dept: ADMINISTRATIVE | Facility: OTHER | Age: 65
End: 2020-10-01

## 2020-10-14 ENCOUNTER — PATIENT MESSAGE (OUTPATIENT)
Dept: INTERNAL MEDICINE | Facility: CLINIC | Age: 65
End: 2020-10-14

## 2020-10-30 ENCOUNTER — LAB VISIT (OUTPATIENT)
Dept: LAB | Facility: HOSPITAL | Age: 65
End: 2020-10-30
Attending: FAMILY MEDICINE
Payer: MEDICARE

## 2020-10-30 DIAGNOSIS — I10 ESSENTIAL HYPERTENSION: ICD-10-CM

## 2020-10-30 DIAGNOSIS — Z00.00 PREVENTATIVE HEALTH CARE: ICD-10-CM

## 2020-10-30 DIAGNOSIS — E55.9 VITAMIN D DEFICIENCY: ICD-10-CM

## 2020-10-30 DIAGNOSIS — J84.10 PULMONARY FIBROSIS: ICD-10-CM

## 2020-10-30 DIAGNOSIS — Z98.890 POST-OPERATIVE STATE: ICD-10-CM

## 2020-10-30 DIAGNOSIS — R91.8 ABNORMAL CT SCAN OF LUNG: ICD-10-CM

## 2020-10-30 DIAGNOSIS — H25.11 NS (NUCLEAR SCLEROSIS), RIGHT: ICD-10-CM

## 2020-10-30 DIAGNOSIS — H25.12 NUCLEAR SENILE CATARACT OF LEFT EYE: ICD-10-CM

## 2020-10-30 DIAGNOSIS — H43.813 VITREOUS DETACHMENT OF BOTH EYES: ICD-10-CM

## 2020-10-30 DIAGNOSIS — H52.7 REFRACTIVE ERROR: ICD-10-CM

## 2020-10-30 DIAGNOSIS — H02.402 PTOSIS OF LEFT EYELID: ICD-10-CM

## 2020-10-30 DIAGNOSIS — Z12.5 PROSTATE CANCER SCREENING: ICD-10-CM

## 2020-10-30 DIAGNOSIS — E78.5 DYSLIPIDEMIA: ICD-10-CM

## 2020-10-30 LAB
25(OH)D3+25(OH)D2 SERPL-MCNC: 76 NG/ML (ref 30–96)
ALBUMIN SERPL BCP-MCNC: 3.8 G/DL (ref 3.5–5.2)
ALP SERPL-CCNC: 51 U/L (ref 38–126)
ALT SERPL W/O P-5'-P-CCNC: 34 U/L (ref 10–44)
ANION GAP SERPL CALC-SCNC: 6 MMOL/L (ref 8–16)
AST SERPL-CCNC: 29 U/L (ref 15–46)
BASOPHILS # BLD AUTO: 0.07 K/UL (ref 0–0.2)
BASOPHILS NFR BLD: 0.9 % (ref 0–1.9)
BILIRUB SERPL-MCNC: 0.4 MG/DL (ref 0.1–1)
BUN SERPL-MCNC: 13 MG/DL (ref 2–20)
CALCIUM SERPL-MCNC: 9 MG/DL (ref 8.7–10.5)
CHLORIDE SERPL-SCNC: 104 MMOL/L (ref 95–110)
CHOLEST SERPL-MCNC: 178 MG/DL (ref 120–199)
CHOLEST/HDLC SERPL: 4 {RATIO} (ref 2–5)
CO2 SERPL-SCNC: 28 MMOL/L (ref 23–29)
COMPLEXED PSA SERPL-MCNC: 0.34 NG/ML (ref 0–4)
CREAT SERPL-MCNC: 0.82 MG/DL (ref 0.5–1.4)
DIFFERENTIAL METHOD: ABNORMAL
EOSINOPHIL # BLD AUTO: 0.3 K/UL (ref 0–0.5)
EOSINOPHIL NFR BLD: 3.6 % (ref 0–8)
ERYTHROCYTE [DISTWIDTH] IN BLOOD BY AUTOMATED COUNT: 13.4 % (ref 11.5–14.5)
EST. GFR  (AFRICAN AMERICAN): >60 ML/MIN/1.73 M^2
EST. GFR  (NON AFRICAN AMERICAN): >60 ML/MIN/1.73 M^2
GLUCOSE SERPL-MCNC: 98 MG/DL (ref 70–110)
HCT VFR BLD AUTO: 41.5 % (ref 40–54)
HDLC SERPL-MCNC: 45 MG/DL (ref 40–75)
HDLC SERPL: 25.3 % (ref 20–50)
HGB BLD-MCNC: 13.7 G/DL (ref 14–18)
IMM GRANULOCYTES # BLD AUTO: 0.01 K/UL (ref 0–0.04)
IMM GRANULOCYTES NFR BLD AUTO: 0.1 % (ref 0–0.5)
LDLC SERPL CALC-MCNC: 113.6 MG/DL (ref 63–159)
LYMPHOCYTES # BLD AUTO: 3.1 K/UL (ref 1–4.8)
LYMPHOCYTES NFR BLD: 39.5 % (ref 18–48)
MCH RBC QN AUTO: 30.2 PG (ref 27–31)
MCHC RBC AUTO-ENTMCNC: 33 G/DL (ref 32–36)
MCV RBC AUTO: 92 FL (ref 82–98)
MONOCYTES # BLD AUTO: 0.7 K/UL (ref 0.3–1)
MONOCYTES NFR BLD: 8.5 % (ref 4–15)
NEUTROPHILS # BLD AUTO: 3.7 K/UL (ref 1.8–7.7)
NEUTROPHILS NFR BLD: 47.4 % (ref 38–73)
NONHDLC SERPL-MCNC: 133 MG/DL
NRBC BLD-RTO: 0 /100 WBC
PLATELET # BLD AUTO: 312 K/UL (ref 150–350)
PMV BLD AUTO: 9.1 FL (ref 9.2–12.9)
POTASSIUM SERPL-SCNC: 4.4 MMOL/L (ref 3.5–5.1)
PROT SERPL-MCNC: 6.4 G/DL (ref 6–8.4)
RBC # BLD AUTO: 4.53 M/UL (ref 4.6–6.2)
SODIUM SERPL-SCNC: 138 MMOL/L (ref 136–145)
T4 FREE SERPL-MCNC: 0.8 NG/DL (ref 0.71–1.51)
TRIGL SERPL-MCNC: 97 MG/DL (ref 30–150)
TSH SERPL DL<=0.005 MIU/L-ACNC: 3.15 UIU/ML (ref 0.4–4)
WBC # BLD AUTO: 7.8 K/UL (ref 3.9–12.7)

## 2020-10-30 PROCEDURE — 84153 ASSAY OF PSA TOTAL: CPT

## 2020-10-30 PROCEDURE — 82306 VITAMIN D 25 HYDROXY: CPT | Mod: PO

## 2020-10-30 PROCEDURE — 36415 COLL VENOUS BLD VENIPUNCTURE: CPT | Mod: PO

## 2020-10-30 PROCEDURE — 80053 COMPREHEN METABOLIC PANEL: CPT | Mod: PO

## 2020-10-30 PROCEDURE — 84439 ASSAY OF FREE THYROXINE: CPT

## 2020-10-30 PROCEDURE — 85025 COMPLETE CBC W/AUTO DIFF WBC: CPT | Mod: PO

## 2020-10-30 PROCEDURE — 80061 LIPID PANEL: CPT

## 2020-10-30 PROCEDURE — 84443 ASSAY THYROID STIM HORMONE: CPT | Mod: PO

## 2020-11-04 ENCOUNTER — OFFICE VISIT (OUTPATIENT)
Dept: INTERNAL MEDICINE | Facility: CLINIC | Age: 65
End: 2020-11-04
Payer: MEDICARE

## 2020-11-04 VITALS
TEMPERATURE: 98 F | WEIGHT: 203.69 LBS | HEART RATE: 64 BPM | HEIGHT: 73 IN | DIASTOLIC BLOOD PRESSURE: 82 MMHG | OXYGEN SATURATION: 98 % | SYSTOLIC BLOOD PRESSURE: 132 MMHG | BODY MASS INDEX: 26.99 KG/M2 | RESPIRATION RATE: 18 BRPM

## 2020-11-04 DIAGNOSIS — E78.5 DYSLIPIDEMIA: ICD-10-CM

## 2020-11-04 DIAGNOSIS — Z23 NEED FOR 23-POLYVALENT PNEUMOCOCCAL POLYSACCHARIDE VACCINE: ICD-10-CM

## 2020-11-04 DIAGNOSIS — Z00.00 WELL ADULT EXAM: Primary | ICD-10-CM

## 2020-11-04 DIAGNOSIS — Z23 NEED FOR PROPHYLACTIC VACCINATION AND INOCULATION AGAINST INFLUENZA: ICD-10-CM

## 2020-11-04 DIAGNOSIS — J84.10 PULMONARY FIBROSIS: ICD-10-CM

## 2020-11-04 DIAGNOSIS — I10 ESSENTIAL HYPERTENSION: ICD-10-CM

## 2020-11-04 PROCEDURE — G0009 ADMIN PNEUMOCOCCAL VACCINE: HCPCS | Mod: PBBFAC,PO

## 2020-11-04 PROCEDURE — 99215 OFFICE O/P EST HI 40 MIN: CPT | Mod: PBBFAC,PO,25 | Performed by: FAMILY MEDICINE

## 2020-11-04 PROCEDURE — 99397 PR PREVENTIVE VISIT,EST,65 & OVER: ICD-10-PCS | Mod: S$PBB,,, | Performed by: FAMILY MEDICINE

## 2020-11-04 PROCEDURE — 90662 IIV NO PRSV INCREASED AG IM: CPT | Mod: PBBFAC,PO

## 2020-11-04 PROCEDURE — 99397 PER PM REEVAL EST PAT 65+ YR: CPT | Mod: S$PBB,,, | Performed by: FAMILY MEDICINE

## 2020-11-04 PROCEDURE — 99999 PR PBB SHADOW E&M-EST. PATIENT-LVL V: ICD-10-PCS | Mod: PBBFAC,,, | Performed by: FAMILY MEDICINE

## 2020-11-04 PROCEDURE — 99999 PR PBB SHADOW E&M-EST. PATIENT-LVL V: CPT | Mod: PBBFAC,,, | Performed by: FAMILY MEDICINE

## 2020-11-04 NOTE — PROGRESS NOTES
Subjective:       Patient ID: Esteban Carson is a 65 y.o. male.    Chief Complaint: Annual Exam (Labs 10/30) and Flu Vaccine    HPI 65-year-old white male patient of Dr. Aguiar presents to clinic today for annual physical exam.  He continues to be followed by pulmonology for treatment of pulmonary fibrosis which remains stable on OFEV.  Hypertension remains well controlled on losartan 25 mg daily.  Dyslipidemia stable with diet.  He reports a past surgical history of left ptosis repair, ganglion cyst removal, cosmetic surgery, and bilateral cataract repair.  He reports a family history of heart disease, esophageal cancer, and liver cancer.  Flu vaccine and pneumonia vaccine will be given today.  Colonoscopy has been discussed but the patient prefers to defer colonoscopy until the pandemic lightens.  Review of Systems   Constitutional: Negative for activity change, appetite change, chills, fatigue, fever and unexpected weight change.   HENT: Negative for nasal congestion, ear pain, hearing loss, postnasal drip, rhinorrhea, sinus pressure/congestion, sore throat, tinnitus and trouble swallowing.    Eyes: Negative for discharge, redness, itching and visual disturbance.   Respiratory: Negative for cough, chest tightness, shortness of breath and wheezing.    Cardiovascular: Negative for chest pain and palpitations.   Gastrointestinal: Negative for abdominal pain, blood in stool, constipation, diarrhea, nausea and vomiting.   Endocrine: Negative for polydipsia and polyuria.   Genitourinary: Negative for decreased urine volume, difficulty urinating, dysuria, frequency, hematuria and urgency.   Musculoskeletal: Positive for arthralgias. Negative for back pain, joint swelling, myalgias, neck pain and neck stiffness.   Integumentary:  Negative for rash.   Neurological: Negative for dizziness, weakness, light-headedness and headaches.   Psychiatric/Behavioral: Negative.  Negative for confusion and dysphoric mood.          Objective:      Physical Exam  Vitals signs and nursing note reviewed.   Constitutional:       General: He is not in acute distress.     Appearance: He is well-developed. He is not diaphoretic.   HENT:      Head: Normocephalic and atraumatic.      Right Ear: External ear normal.      Left Ear: External ear normal.      Nose: Nose normal.      Mouth/Throat:      Pharynx: No oropharyngeal exudate.   Eyes:      General: No scleral icterus.        Right eye: No discharge.         Left eye: No discharge.      Conjunctiva/sclera: Conjunctivae normal.      Pupils: Pupils are equal, round, and reactive to light.   Neck:      Musculoskeletal: Normal range of motion and neck supple.      Thyroid: No thyromegaly.      Vascular: No JVD.      Trachea: No tracheal deviation.   Cardiovascular:      Rate and Rhythm: Normal rate and regular rhythm.      Heart sounds: Normal heart sounds. No murmur. No friction rub. No gallop.    Pulmonary:      Effort: Pulmonary effort is normal. No respiratory distress.      Breath sounds: Normal breath sounds. No stridor. No wheezing or rales.   Abdominal:      General: Bowel sounds are normal. There is no distension.      Palpations: Abdomen is soft. There is no mass.      Tenderness: There is no abdominal tenderness. There is no guarding or rebound.   Musculoskeletal: Normal range of motion.         General: No tenderness.   Lymphadenopathy:      Cervical: No cervical adenopathy.   Skin:     General: Skin is warm and dry.      Coloration: Skin is not pale.      Findings: No erythema or rash.   Neurological:      Mental Status: He is alert and oriented to person, place, and time.   Psychiatric:         Behavior: Behavior normal.         Thought Content: Thought content normal.         Judgment: Judgment normal.         Assessment:       1. Well adult exam    2. Essential hypertension    3. Pulmonary fibrosis    4. Dyslipidemia    5. Need for prophylactic vaccination and inoculation against  influenza    6. Need for 23-polyvalent pneumococcal polysaccharide vaccine        Plan:       1.  Labs have been reviewed and are within normal limits.  2.  1.  Continue losartan 25 mg daily.  Hypertension is well controlled.  3.  Continue OFEV pulmonary fibrosis is stable.  4.  Continue diet and exercise.  Dyslipidemia is well controlled.  5.  Flu vaccine given.  6.  Pneumovax given.  7.  Return to clinic as needed or in 1 year for annual exam.

## 2020-11-10 ENCOUNTER — PATIENT MESSAGE (OUTPATIENT)
Dept: INTERNAL MEDICINE | Facility: CLINIC | Age: 65
End: 2020-11-10

## 2020-11-10 DIAGNOSIS — Z12.11 COLON CANCER SCREENING: Primary | ICD-10-CM

## 2020-12-02 ENCOUNTER — LAB VISIT (OUTPATIENT)
Dept: LAB | Facility: HOSPITAL | Age: 65
End: 2020-12-02
Attending: FAMILY MEDICINE
Payer: MEDICARE

## 2020-12-02 DIAGNOSIS — Z12.11 COLON CANCER SCREENING: ICD-10-CM

## 2020-12-02 PROCEDURE — 82274 ASSAY TEST FOR BLOOD FECAL: CPT

## 2020-12-04 LAB — HEMOCCULT STL QL IA: NEGATIVE

## 2020-12-16 ENCOUNTER — PATIENT MESSAGE (OUTPATIENT)
Dept: OPTOMETRY | Facility: CLINIC | Age: 65
End: 2020-12-16

## 2020-12-16 ENCOUNTER — OFFICE VISIT (OUTPATIENT)
Dept: OPTOMETRY | Facility: CLINIC | Age: 65
End: 2020-12-16
Payer: MEDICARE

## 2020-12-16 DIAGNOSIS — H00.022 HORDEOLUM INTERNUM OF RIGHT LOWER EYELID: Primary | ICD-10-CM

## 2020-12-16 PROCEDURE — 99213 OFFICE O/P EST LOW 20 MIN: CPT | Mod: PBBFAC,PO | Performed by: OPTOMETRIST

## 2020-12-16 PROCEDURE — 92012 PR EYE EXAM, EST PATIENT,INTERMED: ICD-10-PCS | Mod: S$PBB,,, | Performed by: OPTOMETRIST

## 2020-12-16 PROCEDURE — 99999 PR PBB SHADOW E&M-EST. PATIENT-LVL III: CPT | Mod: PBBFAC,,, | Performed by: OPTOMETRIST

## 2020-12-16 PROCEDURE — 99999 PR PBB SHADOW E&M-EST. PATIENT-LVL III: ICD-10-PCS | Mod: PBBFAC,,, | Performed by: OPTOMETRIST

## 2020-12-16 PROCEDURE — 92012 INTRM OPH EXAM EST PATIENT: CPT | Mod: S$PBB,,, | Performed by: OPTOMETRIST

## 2020-12-16 RX ORDER — PREDNISOLONE ACETATE 10 MG/ML
1 SUSPENSION/ DROPS OPHTHALMIC 4 TIMES DAILY
Qty: 1 BOTTLE | Refills: 1 | Status: SHIPPED | OUTPATIENT
Start: 2020-12-16 | End: 2021-12-16

## 2020-12-16 NOTE — PROGRESS NOTES
HPI     Presents today for [facility] due to possible eye infection OD. Pt states last   night eye became swollen and tender. No itchy or tearing. Denies f/f. No   gtts    Last edited by Pablo Tucker, OD on 12/16/2020  9:49 AM. (History)        ROS     Positive for: Eyes (cat surgery OU/ptosis OS)    Negative for: Constitutional, Gastrointestinal, Neurological, Skin,   Genitourinary, Musculoskeletal, HENT, Endocrine, Cardiovascular,   Respiratory, Psychiatric, Allergic/Imm, Heme/Lymph    Last edited by Pablo Tucker, OD on 12/16/2020  9:57 AM. (History)        Assessment /Plan     For exam results, see Encounter Report.    Hordeolum internum of right lower eyelid  -     prednisoLONE acetate (PRED FORTE) 1 % DrpS; Place 1 drop into the right eye 4 (four) times daily.  Dispense: 1 Bottle; Refill: 1      Int sana RLL X 1 day    PLAN:    1) Hot compresses, 5 minutes at a time, QID, followed by PRED FORTE drops QID, for one week.  2) Discussed chalazion formation  3) Pt will call if not resolved in one week, and will schedule excision.  Otherwise rtc as scheduled for routine w Dr Gayle

## 2020-12-18 ENCOUNTER — PATIENT MESSAGE (OUTPATIENT)
Dept: OPTOMETRY | Facility: CLINIC | Age: 65
End: 2020-12-18

## 2020-12-18 DIAGNOSIS — H00.022 HORDEOLUM INTERNUM OF RIGHT LOWER EYELID: Primary | ICD-10-CM

## 2020-12-18 RX ORDER — AMOXICILLIN AND CLAVULANATE POTASSIUM 875; 125 MG/1; MG/1
1 TABLET, FILM COATED ORAL 2 TIMES DAILY
Qty: 20 TABLET | Refills: 0 | Status: SHIPPED | OUTPATIENT
Start: 2020-12-18 | End: 2020-12-28

## 2021-02-04 ENCOUNTER — TELEPHONE (OUTPATIENT)
Dept: INTERNAL MEDICINE | Facility: CLINIC | Age: 66
End: 2021-02-04

## 2021-02-04 ENCOUNTER — PATIENT MESSAGE (OUTPATIENT)
Dept: INTERNAL MEDICINE | Facility: CLINIC | Age: 66
End: 2021-02-04

## 2021-02-04 DIAGNOSIS — Z20.822 EXPOSURE TO COVID-19 VIRUS: Primary | ICD-10-CM

## 2021-02-08 ENCOUNTER — LAB VISIT (OUTPATIENT)
Dept: LAB | Facility: HOSPITAL | Age: 66
End: 2021-02-08
Attending: INTERNAL MEDICINE
Payer: MEDICARE

## 2021-02-08 DIAGNOSIS — Z20.822 EXPOSURE TO COVID-19 VIRUS: ICD-10-CM

## 2021-02-08 LAB — SARS-COV-2 IGG SERPLBLD QL IA.RAPID: NEGATIVE

## 2021-02-08 PROCEDURE — 36415 COLL VENOUS BLD VENIPUNCTURE: CPT

## 2021-02-08 PROCEDURE — 86769 SARS-COV-2 COVID-19 ANTIBODY: CPT

## 2021-02-23 ENCOUNTER — PATIENT MESSAGE (OUTPATIENT)
Dept: INTERNAL MEDICINE | Facility: CLINIC | Age: 66
End: 2021-02-23

## 2021-03-03 ENCOUNTER — IMMUNIZATION (OUTPATIENT)
Dept: PHARMACY | Facility: CLINIC | Age: 66
End: 2021-03-03
Payer: MEDICARE

## 2021-03-03 DIAGNOSIS — Z23 NEED FOR VACCINATION: Primary | ICD-10-CM

## 2021-12-28 ENCOUNTER — PATIENT OUTREACH (OUTPATIENT)
Dept: ADMINISTRATIVE | Facility: HOSPITAL | Age: 66
End: 2021-12-28
Payer: MEDICARE

## 2021-12-28 ENCOUNTER — PATIENT MESSAGE (OUTPATIENT)
Dept: ADMINISTRATIVE | Facility: HOSPITAL | Age: 66
End: 2021-12-28
Payer: MEDICARE

## 2021-12-28 DIAGNOSIS — I10 ESSENTIAL HYPERTENSION: ICD-10-CM

## 2021-12-29 ENCOUNTER — TELEPHONE (OUTPATIENT)
Dept: INTERNAL MEDICINE | Facility: CLINIC | Age: 66
End: 2021-12-29
Payer: MEDICARE

## 2021-12-29 RX ORDER — LOSARTAN POTASSIUM 25 MG/1
25 TABLET ORAL DAILY
Qty: 90 TABLET | Refills: 3 | Status: SHIPPED | OUTPATIENT
Start: 2021-12-29 | End: 2022-02-16

## 2022-01-08 ENCOUNTER — PATIENT MESSAGE (OUTPATIENT)
Dept: ADMINISTRATIVE | Facility: HOSPITAL | Age: 67
End: 2022-01-08
Payer: MEDICARE

## 2022-02-16 DIAGNOSIS — I10 ESSENTIAL HYPERTENSION: ICD-10-CM

## 2022-02-16 RX ORDER — LOSARTAN POTASSIUM 25 MG/1
TABLET ORAL
Qty: 90 TABLET | Refills: 3 | Status: SHIPPED | OUTPATIENT
Start: 2022-02-16 | End: 2022-11-02

## 2022-06-02 ENCOUNTER — PATIENT MESSAGE (OUTPATIENT)
Dept: INTERNAL MEDICINE | Facility: CLINIC | Age: 67
End: 2022-06-02
Payer: MEDICARE

## 2022-06-02 DIAGNOSIS — Z12.11 COLON CANCER SCREENING: Primary | ICD-10-CM

## 2022-06-29 ENCOUNTER — PATIENT MESSAGE (OUTPATIENT)
Dept: INTERNAL MEDICINE | Facility: CLINIC | Age: 67
End: 2022-06-29
Payer: MEDICARE

## 2022-07-09 ENCOUNTER — LAB VISIT (OUTPATIENT)
Dept: LAB | Facility: HOSPITAL | Age: 67
End: 2022-07-09
Attending: INTERNAL MEDICINE
Payer: MEDICARE

## 2022-07-09 DIAGNOSIS — Z12.11 COLON CANCER SCREENING: ICD-10-CM

## 2022-07-09 PROCEDURE — 82274 ASSAY TEST FOR BLOOD FECAL: CPT | Performed by: INTERNAL MEDICINE

## 2022-07-14 LAB — HEMOCCULT STL QL IA: POSITIVE

## 2022-07-19 ENCOUNTER — TELEPHONE (OUTPATIENT)
Dept: INTERNAL MEDICINE | Facility: CLINIC | Age: 67
End: 2022-07-19
Payer: MEDICARE

## 2022-07-19 DIAGNOSIS — R19.5 POSITIVE FECAL OCCULT BLOOD TEST: ICD-10-CM

## 2022-07-19 DIAGNOSIS — Z12.11 COLON CANCER SCREENING: Primary | ICD-10-CM

## 2022-07-20 NOTE — TELEPHONE ENCOUNTER
EM[facility] will not allow me to enter order for C-scope.    Thus will enter referral to Gastro for evaluation

## 2022-07-20 NOTE — TELEPHONE ENCOUNTER
Please contact pt and advise his stool test returned positive for hidden blood.  To evaluates further he should have Colonoscopy.  Let me know if agreeable or if he has questions in that regard.

## 2022-07-20 NOTE — TELEPHONE ENCOUNTER
Spoke with pt to advise of results and MD recommendations.    Verbalized understanding (he will be out of town until late august. He will schedule in early September).     Please enter colonoscopy orders.    Thank you.    (# to schedule provided to the pt)

## 2022-07-31 ENCOUNTER — PATIENT MESSAGE (OUTPATIENT)
Dept: INTERNAL MEDICINE | Facility: CLINIC | Age: 67
End: 2022-07-31
Payer: MEDICARE

## 2022-09-12 DIAGNOSIS — M25.562 BILATERAL KNEE PAIN: Primary | ICD-10-CM

## 2022-09-12 DIAGNOSIS — M25.561 BILATERAL KNEE PAIN: Primary | ICD-10-CM

## 2022-09-14 ENCOUNTER — CLINICAL SUPPORT (OUTPATIENT)
Dept: REHABILITATION | Facility: HOSPITAL | Age: 67
End: 2022-09-14
Payer: MEDICARE

## 2022-09-14 DIAGNOSIS — R29.898 WEAKNESS OF BOTH HIPS: ICD-10-CM

## 2022-09-14 DIAGNOSIS — Z74.09 IMPAIRED FUNCTIONAL MOBILITY, BALANCE, GAIT, AND ENDURANCE: ICD-10-CM

## 2022-09-14 PROCEDURE — 97161 PT EVAL LOW COMPLEX 20 MIN: CPT | Mod: PO

## 2022-09-14 NOTE — PLAN OF CARE
[facility] (PT) Initial Evaluation- KNEE       Name: Esteban Carson  Clinic Number: [id number]  YOB: 1955    Therapy Diagnosis:   Encounter Diagnoses   Name Primary?    Weakness of both hips     Impaired functional mobility, balance, gait, and endurance      Physician: Varun Clark MD    Physician Orders: Evaluate and treat  Medical Diagnosis: M25.561,M25.562 (ICD-10-CM) - Bilateral knee pain  Surgical Procedure and Date: None  Evaluation Date: 9/14/2022  Insurance Authorization Period Expiration: 12/31/2022  Plan of Care Certification Period: 11/9/2022  Progress Note Due: 6th visit or 30 days   Date of Return to MD: Follow after PT POC  Visit # / Visits authorized: 1 / 1    Precautions:  Standard    Time In:1:05 pm  Time Out: 1:50 pm  Total Appointment Time (timed & untimed codes): 45 minutes    Subjective     Date of onset: 4 months ago    History of current condition: Esteban Carson presents with right lower leg pain. Patient reports the pain has been increasing over the past 4 months. He states the pain increases with prolong walking and when playing tennis. Patient states the pain is described as sharp and located on the front part of his right lower leg in the region of the anterior tibialis. Patient was reports no recall of injuring the leg. Patient also states periods of the LE feeling weak.    Past medical history:   Past Medical History:   Diagnosis Date    Cataract     Emphysema (subcutaneous) (surgical) resulting from a procedure     Essential hypertension 3/4/2020    Hyperlipidemia     ILD (interstitial lung disease)     Osteoarthritis        Past Surgical history:  has a past surgical history that includes left ptosis repair; wrist ganglion cyst; Facial cosmetic surgery; Phacoemulsification of cataract (Left, 2/4/2020); Intraocular prosthesis insertion (Left, 2/4/2020); Phacoemulsification of cataract (Right, 3/3/2020); Intraocular prosthesis  insertion (Right, 3/3/2020); Cataract extraction w/  intraocular lens implant (Left, 02/04/2020); and Cataract extraction w/  intraocular lens implant (Right, 03/03/2020).    Medications: Esteban has a current medication list which includes the following prescription(s): ascorbic acid (vitamin c), aspirin, cholecalciferol (vitamin d3), fluticasone propionate, fluzone high-dose 2019-20 (pf), ibuprofen, krill oil/omega-3/dha/epa, lactobac no.41/bifidobact no.7, losartan, milk thistle, multivitamin, ofev, turmeric root extract, and vitamin k2, and the following Facility-Administered Medications: sodium chloride 0.9%, sodium chloride 0.9%, cyclopentolate 1%, phenylephrine hcl 2.5%, phenylephrine hcl 2.5%, proparacaine, proparacaine, tropicamide 1%, and tropicamide 1%.    Allergies: Review of patient's allergies indicates:  No Known Allergies    Prior Therapy: None for LLE  Social: Lives with wife  Occupation: Retired   Prior Level of Function: Limited with all ADL's/IADL's  Current Level of Function: Patient has increased pain in lower leg with prolong walking in the grocery store.   Patient's goals: Decrease lower extremity pain.     CMS Impairment/Limitation/Restriction for FOTO Survey    Therapist reviewed FOTO scores for Esteban Carson on 9/14/2022.   FOTO documents entered into [employer] - see Media section.    Limitation Score: 46%         Environmental/Abuse/Neglect/Sensory concerns: None  The patient's spiritual, cultural, social and education needs were considered with no evidence of barriers noted.     Suicide Prevention Screening:  Do you ever have thoughts of injuring or harming yourself? []Yes   [x]No If yes, explain/actions taken:   Do you feel threatened by anyone or feel unsafe at home?  []Yes   [x]No If yes, explain/actions taken:   Any physical signs of abuse present?               []Yes   [x]No If yes, explain/actions taken:     Pain: Current 0/10, worst 8/10, best 2/10   Location:   lower  legs  Description: Sharp  Aggravating Factors: Walking  Easing Factors: rest    Objective     Posture: Patient has increased lumbar lordosis, increased rounded shoulders and elevated left shoulder.     Gait: Patient ambulates with decrease heel strike and increased rounded shoulders.     Balance: Single Leg Stance (SLS): Left (L) 4 seconds Right (R) 5 seconds  Tandem: 10 seconds   6 minutes: 0.15 mi    Range of Motion (ROM):    LEFT RIGHT   Hip flexion WNL WNL   Knee flexion WNL WNL   Knee extension WNL WNL   Ankle dorsiflexion (DF) 5 0   Ankle plantarflexion (PF) 50 60   IV 12 10   EV 25 20     Manual Muscle Test (MMT):   LEFT RIGHT   Hip flexion 4/5 4/5   Hip extension 4/5 4/5   Hip abduction 3+/5 3+/5   Hip adduction 4/5 4/5   Knee flexion 4+/5 4+/5   Knee extension 4+/5 4+/5   Ankle DF 4+/5 4+/5   Ankle PF 5/5 5/5       FLEXIBILITY   LEFT RIGHT   Hamstring 70 ° 70 °   Piriformis + +   Ely  + +   Gastroc-Soleus + +         Special tests:   [] Anterior/Posterior drawer [] Georges's/Grind [] Varus/Valgus stress [] Apley's  [] Fide's  [] Step up    Treatment   Evaluation only    Home Exercises and Patient Education Provided  Education provided:   Patient was educated on HEP, walking mechanics, prognosis, activity modification, and the proper use of ice.     Written Home Exercise Program (HEP) Provided: Patient instructed to cont prior HEP.  Exercises were reviewed and Esteban was able to demonstrate them prior to the end of the session.  Esteban demonstrated good  understanding of the education provided.     See Electronic Medical Record under Patient Instructions for exercises provided 9/14/2022.    Assessment   Esteban is a 67 y.o. male referred to outpatient Physical Therapy with a medical diagnosis of bilateral knee pain. Patient presents with pain located in the region of the right anterior lateral lower right leg. Patient has pain that increases in the right hip when performing the 6 minute walk test. Patient has  mild pain when palpating the anterior tibialis and peroneus longus/brevis muscles. He appears to have a grade 1 muscle strain that appears to keep getting inflamed after playing tennis. Patient also has deficits such as decrease hip strength, mild decrease hip flexibility, and low endurance of the LE muscles. Patient would benefit from activity modification, manual therapy, and therapeutic exercises in order to return to PLOF. Rehab prognosis is: Good.     Patient will benefit from skilled outpatient Physical Therapy to address the deficits stated above and in the chart below, provide patient/family education, and to maximize patient's level of independence.     Medical necessity is demonstrated by the following IMPAIRMENTS:  Weakness    Patient's spiritual, cultural and educational needs considered and patient is agreeable to the plan of care and goals as stated below:     Anticipated Barriers for therapy: Chronic knee pain    History  Co-morbidities and personal factors that may impact the plan of care Co-morbidities:   HTN    Personal Factors:   no deficits     low   Examination  Body Structures and Functions, activity limitations and participation restrictions that may impact the plan of care Body Regions:   lower extremities    Body Systems:    ROM  strength  balance  gait    Participation Restrictions:   Patient has difficulty walking in [facility] from a prolong distance due to increase leg pain.     Activity limitations:   Learning and applying knowledge  no deficits    General Tasks and Commands  no deficits    Communication  no deficits    Mobility  walking    Self care  no deficits    Domestic Life  doing house work (cleaning house, washing dishes, laundry)    Interactions/Relationships  no deficits    Life Areas  no deficits    Community and Social Life  community life  recreation and leisure         low   Clinical Presentation evolving clinical presentation with changing clinical characteristics low  "  Decision Making/ Complexity Score: low     GOALS  Short Term Goals (4 weeks):  1. Patient will have improved AROM of the right ankle to >0 to improve toe up during swing face when ambulating.  2. Patient will have decreased complaints of pain to [facility]10 [facility]  3. Patient will be independent and compliant with issued HEP.  4. Patient will improve 6 minutes walk test to >0.15 mi with no reports of pain.    Long Term Goals (8 weeks):   Patient will have improved SLS to >4 seconds in order to decrease risk of falls on uneven surfaces.   2. Patient will have improved strength of hip abductors to  4+/5 to increase pelvis stability during ambulation.  3. Patient will be able to resume prior functional level with no deficits.   4. Patient will have overall improvement in condition to have increased score on FOTO to 28%.     Plan     Plan of care Certification: 9/14/2022 to 11/9/2022.    Outpatient Physical Therapy 2 times weekly for 4 weeks to include the following interventions: Gait Training, Manual Therapy, Moist Heat/ Ice, and Therapeutic Exercise.       Plan  Date    Visit    POC    FTF    FOTO        Bike 10' Lv 2   Slant board stretch 2 x 30"   Heel raises x30   Piriformis stretch 2 x 30"   Supine quad stretch w/strap 2 x 30"   SLR X20 3 " hold   SL Clam GTB   LAQ 2 x 10 4lb 3" hold   Seated leg curl 2 x 10 GTB   Standing hip abd/ext    SLS 2 x 15"   Tandem  2 x 30"   Manual therapy: Ankle PROM, STM, Talocrural mobs     Initials:                Kennedy Barrett, PT  9/14/2022  "

## 2022-11-01 NOTE — PROGRESS NOTES
Cardiology Clinic Note  Reason for Visit: eval for PAD    HPI:     Esteban Carson is a 67 y.o. M, who presents for evaluation of possible PAD.    He reports fatigue in R lower leg with exertion.  He was playing tennis regularly before Anastasiya. Lost house. Stopped playing.  On return to tennis, now unable to play due to fatigue in leg. Symptoms improve with rest after ~3 minutes.  Also has symptoms walking in [employer] on occasion.  No wounds. No pallor/skin changes. No rest pain.    -150s at home. Rare 130s.    Medical: HTN, HLD, ILD/pulm fibrosis, calcified coronary lesion (CT - mild, LAD/RCA)  Surgical: Reviewed, as below.  Family: Reviewed, as below. Father with heart disease. Brother with heart disease.  Social: Reviewed, as below. Former smoker of 25y, quit 2014.    ROS:    Pertinent ROS included in HPI and below.  PMH:     Past Medical History:   Diagnosis Date    Cataract     Emphysema (subcutaneous) (surgical) resulting from a procedure     Essential hypertension 3/4/2020    Hyperlipidemia     ILD (interstitial lung disease)     Osteoarthritis      Past Surgical History:   Procedure Laterality Date    CATARACT EXTRACTION W/  INTRAOCULAR LENS IMPLANT Left 02/04/2020    Dr. Perry    CATARACT EXTRACTION W/  INTRAOCULAR LENS IMPLANT Right 03/03/2020    Dr. Perry    FACIAL COSMETIC SURGERY      INTRAOCULAR PROSTHESES INSERTION Left 2/4/2020    Procedure: INSERTION, IOL PROSTHESIS;  Surgeon: Hravey Perry MD;  Location: [facility] OR [address] [place] [address];  Service: Ophthalmology;  Laterality: Left;    INTRAOCULAR PROSTHESES INSERTION Right 3/3/2020    Procedure: INSERTION, IOL PROSTHESIS;  Surgeon: Harvey Perry MD;  Location: [facility] OR [address] [place] [address];  Service: Ophthalmology;  Laterality: Right;    left ptosis repair      PHACOEMULSIFICATION OF CATARACT Left 2/4/2020    Procedure: PHACOEMULSIFICATION, CATARACT;  Surgeon: Harvey Perry MD;  Location: [facility] OR [address] [place] [address];  Service: Ophthalmology;   Laterality: Left;    PHACOEMULSIFICATION OF CATARACT Right 3/3/2020    Procedure: PHACOEMULSIFICATION, CATARACT;  Surgeon: Harvey Perry MD;  Location: [facility] OR [address] [place] [address];  Service: Ophthalmology;  Laterality: Right;    wrist ganglion cyst       Allergies:   Review of patient's allergies indicates:  No Known Allergies  Medications:     Current Outpatient Medications on File Prior to Visit   Medication Sig Dispense Refill    ascorbic acid (VITAMIN C) 100 MG tablet Take 100 mg by mouth once daily.      aspirin (ECOTRIN) 81 MG EC tablet Take 81 mg by mouth once daily.      cholecalciferol, vitamin D3, (VITAMIN D3) 5,000 unit Tab Take 5,000 Units by mouth once daily.      fluticasone (FLONASE) 50 mcg/actuation nasal spray 2 sprays by Each Nare route once daily. (Patient taking differently: 2 sprays by Each Nare route daily as needed. ) 16 g 11    FLUZONE HIGH-DOSE 2019-20, PF, 180 mcg/0.5 mL Syrg TO BE ADMINISTERED BY PHARMACIST FOR IMMUNIZATION  0    ibuprofen (ADVIL,MOTRIN) 200 MG tablet Take 200 mg by mouth every 6 (six) hours as needed.      KRILL OIL-OMEGA-3-DHA-EPA ORAL Take 1 capsule by mouth once daily.      LACTOBAC NO.41/BIFIDOBACT NO.7 (PROBIOTIC-10 ORAL) Take 1 capsule by mouth once daily.      losartan (COZAAR) 25 MG tablet TAKE ONE TABLET BY MOUTH EVERY DAY 90 tablet 3    milk thistle 150 mg Cap Take 1 capsule by mouth once daily.      multivitamin capsule Take 1 capsule by mouth once daily.      OFEV 150 mg Cap       turmeric root extract 500 mg Cap Take 3 capsules by mouth once daily.      VITAMIN K2 ORAL 100 mcg once daily.       Current Facility-Administered Medications on File Prior to Visit   Medication Dose Route Frequency Provider Last Rate Last Admin    0.9%  NaCl infusion   Intravenous Continuous Harvey Perry MD   Stopped at 02/04/20 1600    0.9%  NaCl infusion   Intravenous Continuous Harvey Perry MD 10 mL/hr at 03/03/20 1352 1,000 mL at 03/03/20 1352    cyclopentolate  "1% ophthalmic solution 1 drop  1 drop Left Eye On Call Procedure Harvey Perry MD   1 drop at 20 1343    phenylephrine HCL 2.5% ophthalmic solution 1 drop  1 drop Left Eye On Call Procedure Harvey Perry MD   1 drop at 20 1343    phenylephrine HCL 2.5% ophthalmic solution 1 drop  1 drop Right Eye On Call Procedure Harvey Perry MD   1 drop at 20 1352    proparacaine 0.5 % ophthalmic solution 1 drop  1 drop Left Eye On Call Procedure Harvey Perry MD   1 drop at 20 1330    proparacaine 0.5 % ophthalmic solution 1 drop  1 drop Right Eye On Call Procedure Harvey Perry MD   1 drop at 20 1333    tropicamide 1% ophthalmic solution 1 drop  1 drop Left Eye On Call Procedure Harvey Perry MD   1 drop at 20 1342    tropicamide 1% ophthalmic solution 1 drop  1 drop Right Eye On Call Procedure Harvey Perry MD   1 drop at 20 1352     Social History:     Social History     Tobacco Use    Smoking status: Former     Packs/day: 0.30     Years: 25.00     Pack years: 7.50     Types: Cigarettes     Quit date: 2014     Years since quittin.2    Smokeless tobacco: Never   Substance Use Topics    Alcohol use: No     Family History:     Family History   Problem Relation Age of Onset    Heart disease Father     Heart disease Brother     Liver cancer Brother     Macular degeneration Mother     Blindness Mother     Cataracts Mother     Glaucoma Maternal Grandmother     Esophageal cancer Brother     Amblyopia Neg Hx     Retinal detachment Neg Hx     Strabismus Neg Hx      Physical Exam:   BP (!) 160/77 (BP Location: Left arm, Patient Position: Sitting, BP Method: Medium (Automatic))   Pulse 60   Ht 6' 1" (1.854 m)   Wt 95.9 kg (211 lb 6.7 oz)   SpO2 98%   BMI 27.89 kg/m²      Constitutional: No apparent distress, conversant  Neck: No jugular venous distension, no carotid bruits  CV: Regular rate and rhythm, no murmurs, normal " S1/S2  Pulm: R lower lobe crackles  Extremities: No lower extremity edema, warm, 1+ PT/DP pulses on R LE, 2+ DP pulses on L LE    Labs:     Blood Tests:  Lab Results   Component Value Date     10/30/2020    K 4.4 10/30/2020     10/30/2020    CO2 28 10/30/2020    BUN 13 10/30/2020    CREATININE 0.82 10/30/2020    GLU 98 10/30/2020    AST 29 10/30/2020    ALT 34 10/30/2020    ALBUMIN 3.8 10/30/2020    PROT 6.4 10/30/2020    BILITOT 0.4 10/30/2020    WBC 7.80 10/30/2020    HGB 13.7 (L) 10/30/2020    HCT 41.5 10/30/2020    MCV 92 10/30/2020     10/30/2020    PSA 0.34 10/30/2020    TSH 3.150 10/30/2020       Lab Results   Component Value Date    CHOL 178 10/30/2020    HDL 45 10/30/2020    TRIG 97 10/30/2020       Lab Results   Component Value Date    LDLCALC 113.6 10/30/2020       Urine Tests:  Lab Results   Component Value Date    COLORU Yellow 10/30/2020    APPEARANCEUA Clear 10/30/2020    PHUR 5.0 10/30/2020    SPECGRAV 1.020 10/30/2020    PROTEINUA Negative 10/30/2020    GLUCUA Negative 10/30/2020    KETONESU Negative 10/30/2020    BILIRUBINUA Negative 10/30/2020    OCCULTUA Negative 10/30/2020    NITRITE Negative 10/30/2020    UROBILINOGEN Negative 10/30/2020    LEUKOCYTESUR Negative 10/30/2020    CREATRANDUR 269.0 11/02/2017       Imaging:     Echocardiogram  TTE 10/31/17  CONCLUSIONS     1 - Normal left ventricular systolic function (EF 60-65%).     2 - No wall motion abnormalities.     3 - Normal left ventricular diastolic function.     4 - Normal right ventricular systolic function .     5 - The estimated PA systolic pressure is 35 mmHg.     6 - Trivial to mild mitral regurgitation.     7 - Mild tricuspid regurgitation.     Stress testing  None    Cath Lab  None    Other  CACS 12/18/13  C-T CORONARY CALCIUM SCORE   Number of lesions: 2                  Number of vessels involved: 1   Your calcium score is 8 which puts you between the 25th and 50th percentile for your gender and age bracket.  (Left Main = 8)   This score indicates minimal plaque burden and a low coronary heart disease risk. This would suggest that significant coronary heart disease is very unlikely.     EK13 - sinus clinton (personally reviewed)    Assessment:     1. Essential hypertension    2. Dyslipidemia    3. Pulmonary fibrosis    4. Claudication    5. Screening for prostate cancer        Plan:     Claudication  Obtain rest/exercise ABIs  Continue ASA 81mg qd    Discussed need to start statin  Obtain basic labs    Essential hypertension  BP above goal  Continue losartan 100mg qd  Start chlorthalidone 25mg qd  Labs in 2 weeks  Low salt    Dyslipidemia  Obtain labs, as above  Plan to start statin    Pulmonary fibrosis  Stable on ofev    ADDENDUM:  ABIs suggestive of R lower extremity PAD as etiology of symptoms.  Continue ASA, start rosuvastatin 20mg qd.  Structured exercise program trial for 3 months, then re-evaluate.    Signed:  Froylan Delacruz MD  Cardiology     2022 10:30 AM    Follow-up:     Future Appointments   Date Time Provider Department Center   2022 10:00 AM Royal Delacruz III, MD [facility] CARDIO [facility]

## 2022-11-02 ENCOUNTER — OFFICE VISIT (OUTPATIENT)
Dept: CARDIOLOGY | Facility: CLINIC | Age: 67
End: 2022-11-02
Payer: MEDICARE

## 2022-11-02 ENCOUNTER — HOSPITAL ENCOUNTER (OUTPATIENT)
Dept: CARDIOLOGY | Facility: HOSPITAL | Age: 67
Discharge: HOME | End: 2022-11-02
Attending: INTERNAL MEDICINE
Payer: MEDICARE

## 2022-11-02 ENCOUNTER — PATIENT MESSAGE (OUTPATIENT)
Dept: CARDIOLOGY | Facility: CLINIC | Age: 67
End: 2022-11-02

## 2022-11-02 VITALS
SYSTOLIC BLOOD PRESSURE: 160 MMHG | HEIGHT: 73 IN | WEIGHT: 211.44 LBS | HEART RATE: 60 BPM | DIASTOLIC BLOOD PRESSURE: 77 MMHG | BODY MASS INDEX: 28.02 KG/M2 | OXYGEN SATURATION: 98 %

## 2022-11-02 DIAGNOSIS — I10 ESSENTIAL HYPERTENSION: Primary | ICD-10-CM

## 2022-11-02 DIAGNOSIS — Z12.5 SCREENING FOR PROSTATE CANCER: ICD-10-CM

## 2022-11-02 DIAGNOSIS — E78.5 DYSLIPIDEMIA: ICD-10-CM

## 2022-11-02 DIAGNOSIS — J84.10 PULMONARY FIBROSIS: ICD-10-CM

## 2022-11-02 DIAGNOSIS — I73.9 CLAUDICATION: ICD-10-CM

## 2022-11-02 LAB
IMMEDIATE ARM BP: 193 MMHG
IMMEDIATE LEFT ABI: 0.89
IMMEDIATE LEFT TIBIAL: 171 MMHG
IMMEDIATE RIGHT ABI: 0.25
IMMEDIATE RIGHT TIBIAL: 49 MMHG
LEFT ABI: 1.13
LEFT ARM BP: 164 MMHG
LEFT DORSALIS PEDIS: 188 MMHG
LEFT POSTERIOR TIBIAL: 165 MMHG
RIGHT ABI: 0.68
RIGHT ARM BP: 167 MMHG
RIGHT DORSALIS PEDIS: 113 MMHG
RIGHT POSTERIOR TIBIAL: 102 MMHG
TREADMILL GRADE: 12 %
TREADMILL SPEED: 2 MPH
TREADMILL TIME: 5 MIN

## 2022-11-02 PROCEDURE — 99204 OFFICE O/P NEW MOD 45 MIN: CPT | Mod: S$PBB,,, | Performed by: INTERNAL MEDICINE

## 2022-11-02 PROCEDURE — 99204 PR OFFICE/OUTPT VISIT, NEW, LEVL IV, 45-59 MIN: ICD-10-PCS | Mod: S$PBB,,, | Performed by: INTERNAL MEDICINE

## 2022-11-02 PROCEDURE — 93924 LWR XTR VASC STDY BILAT: CPT

## 2022-11-02 PROCEDURE — 99999 PR PBB SHADOW E&M-EST. PATIENT-LVL IV: CPT | Mod: PBBFAC,,, | Performed by: INTERNAL MEDICINE

## 2022-11-02 PROCEDURE — 99214 OFFICE O/P EST MOD 30 MIN: CPT | Mod: PBBFAC,25 | Performed by: INTERNAL MEDICINE

## 2022-11-02 PROCEDURE — 93924 ANKLE BRACHIAL INDICES (ABI): ICD-10-PCS | Mod: 26,,, | Performed by: INTERNAL MEDICINE

## 2022-11-02 PROCEDURE — 99999 PR PBB SHADOW E&M-EST. PATIENT-LVL IV: ICD-10-PCS | Mod: PBBFAC,,, | Performed by: INTERNAL MEDICINE

## 2022-11-02 PROCEDURE — 93924 LWR XTR VASC STDY BILAT: CPT | Mod: 26,,, | Performed by: INTERNAL MEDICINE

## 2022-11-02 RX ORDER — CHLORTHALIDONE 25 MG/1
25 TABLET ORAL DAILY
Qty: 30 TABLET | Refills: 11 | Status: SHIPPED | OUTPATIENT
Start: 2022-11-02 | End: 2022-11-22 | Stop reason: SDUPTHER

## 2022-11-02 RX ORDER — ROSUVASTATIN CALCIUM 20 MG/1
20 TABLET, COATED ORAL DAILY
Qty: 90 TABLET | Refills: 3 | Status: SHIPPED | OUTPATIENT
Start: 2022-11-02 | End: 2023-08-18

## 2022-11-02 RX ORDER — LOSARTAN POTASSIUM 25 MG/1
100 TABLET ORAL DAILY
Qty: 90 TABLET | Refills: 3
Start: 2022-11-02 | End: 2022-11-11

## 2022-11-11 ENCOUNTER — LAB VISIT (OUTPATIENT)
Dept: LAB | Facility: HOSPITAL | Age: 67
End: 2022-11-11
Attending: INTERNAL MEDICINE
Payer: MEDICARE

## 2022-11-11 ENCOUNTER — PATIENT MESSAGE (OUTPATIENT)
Dept: CARDIOLOGY | Facility: CLINIC | Age: 67
End: 2022-11-11
Payer: MEDICARE

## 2022-11-11 DIAGNOSIS — I73.9 CLAUDICATION: ICD-10-CM

## 2022-11-11 DIAGNOSIS — I10 ESSENTIAL HYPERTENSION: ICD-10-CM

## 2022-11-11 DIAGNOSIS — Z12.5 SCREENING FOR PROSTATE CANCER: ICD-10-CM

## 2022-11-11 LAB
ALBUMIN SERPL BCP-MCNC: 4.4 G/DL (ref 3.5–5.2)
ALP SERPL-CCNC: 59 U/L (ref 38–126)
ALT SERPL W/O P-5'-P-CCNC: 30 U/L (ref 10–44)
ANION GAP SERPL CALC-SCNC: 9 MMOL/L (ref 8–16)
AST SERPL-CCNC: 27 U/L (ref 15–46)
BASOPHILS # BLD AUTO: 0.06 K/UL (ref 0–0.2)
BASOPHILS NFR BLD: 0.7 % (ref 0–1.9)
BILIRUB SERPL-MCNC: 0.7 MG/DL (ref 0.1–1)
CALCIUM SERPL-MCNC: 9.2 MG/DL (ref 8.7–10.5)
CHLORIDE SERPL-SCNC: 99 MMOL/L (ref 95–110)
CHOLEST SERPL-MCNC: 108 MG/DL (ref 120–199)
CHOLEST/HDLC SERPL: 2.8 {RATIO} (ref 2–5)
CO2 SERPL-SCNC: 32 MMOL/L (ref 23–29)
COMPLEXED PSA SERPL-MCNC: 0.33 NG/ML (ref 0–4)
CREAT SERPL-MCNC: 1.09 MG/DL (ref 0.5–1.4)
DIFFERENTIAL METHOD: ABNORMAL
EOSINOPHIL # BLD AUTO: 0.2 K/UL (ref 0–0.5)
EOSINOPHIL NFR BLD: 2.6 % (ref 0–8)
ERYTHROCYTE [DISTWIDTH] IN BLOOD BY AUTOMATED COUNT: 12.7 % (ref 11.5–14.5)
EST. GFR  (NO RACE VARIABLE): >60 ML/MIN/1.73 M^2
GLUCOSE SERPL-MCNC: 116 MG/DL (ref 70–110)
HCT VFR BLD AUTO: 45.1 % (ref 40–54)
HDLC SERPL-MCNC: 38 MG/DL (ref 40–75)
HDLC SERPL: 35.2 % (ref 20–50)
HGB BLD-MCNC: 15.3 G/DL (ref 14–18)
IMM GRANULOCYTES # BLD AUTO: 0.03 K/UL (ref 0–0.04)
IMM GRANULOCYTES NFR BLD AUTO: 0.3 % (ref 0–0.5)
LDLC SERPL CALC-MCNC: 51 MG/DL (ref 63–159)
LYMPHOCYTES # BLD AUTO: 2.9 K/UL (ref 1–4.8)
LYMPHOCYTES NFR BLD: 32.7 % (ref 18–48)
MCH RBC QN AUTO: 30.3 PG (ref 27–31)
MCHC RBC AUTO-ENTMCNC: 33.9 G/DL (ref 32–36)
MCV RBC AUTO: 89 FL (ref 82–98)
MONOCYTES # BLD AUTO: 0.8 K/UL (ref 0.3–1)
MONOCYTES NFR BLD: 9 % (ref 4–15)
NEUTROPHILS # BLD AUTO: 4.9 K/UL (ref 1.8–7.7)
NEUTROPHILS NFR BLD: 54.7 % (ref 38–73)
NONHDLC SERPL-MCNC: 70 MG/DL
NRBC BLD-RTO: 0 /100 WBC
PLATELET # BLD AUTO: 335 K/UL (ref 150–450)
PMV BLD AUTO: 8.8 FL (ref 9.2–12.9)
POTASSIUM SERPL-SCNC: 4.1 MMOL/L (ref 3.5–5.1)
PROT SERPL-MCNC: 6.9 G/DL (ref 6–8.4)
RBC # BLD AUTO: 5.05 M/UL (ref 4.6–6.2)
SODIUM SERPL-SCNC: 140 MMOL/L (ref 136–145)
TRIGL SERPL-MCNC: 95 MG/DL (ref 30–150)
TSH SERPL DL<=0.005 MIU/L-ACNC: 2.13 UIU/ML (ref 0.4–4)
UUN UR-MCNC: 25 MG/DL (ref 2–20)
WBC # BLD AUTO: 8.9 K/UL (ref 3.9–12.7)

## 2022-11-11 PROCEDURE — 85025 COMPLETE CBC W/AUTO DIFF WBC: CPT | Mod: PO | Performed by: INTERNAL MEDICINE

## 2022-11-11 PROCEDURE — 80061 LIPID PANEL: CPT | Performed by: INTERNAL MEDICINE

## 2022-11-11 PROCEDURE — 84153 ASSAY OF PSA TOTAL: CPT | Performed by: INTERNAL MEDICINE

## 2022-11-11 PROCEDURE — 84443 ASSAY THYROID STIM HORMONE: CPT | Mod: PO | Performed by: INTERNAL MEDICINE

## 2022-11-11 PROCEDURE — 80053 COMPREHEN METABOLIC PANEL: CPT | Mod: PO | Performed by: INTERNAL MEDICINE

## 2022-11-11 PROCEDURE — 36415 COLL VENOUS BLD VENIPUNCTURE: CPT | Mod: PO | Performed by: INTERNAL MEDICINE

## 2022-11-11 RX ORDER — LOSARTAN POTASSIUM 50 MG/1
50 TABLET ORAL DAILY
Start: 2022-11-11 | End: 2023-07-10 | Stop reason: SDUPTHER

## 2022-11-22 ENCOUNTER — PATIENT MESSAGE (OUTPATIENT)
Dept: CARDIOLOGY | Facility: CLINIC | Age: 67
End: 2022-11-22
Payer: MEDICARE

## 2022-11-22 RX ORDER — CHLORTHALIDONE 25 MG/1
25 TABLET ORAL DAILY
Qty: 90 TABLET | Refills: 3 | Status: SHIPPED | OUTPATIENT
Start: 2022-11-22 | End: 2023-12-20

## 2023-07-09 ENCOUNTER — PATIENT MESSAGE (OUTPATIENT)
Dept: CARDIOLOGY | Facility: CLINIC | Age: 68
End: 2023-07-09
Payer: MEDICARE

## 2023-07-10 DIAGNOSIS — I10 ESSENTIAL HYPERTENSION: ICD-10-CM

## 2023-07-10 RX ORDER — LOSARTAN POTASSIUM 50 MG/1
50 TABLET ORAL DAILY
Start: 2023-07-10 | End: 2023-07-31 | Stop reason: SDUPTHER

## 2023-07-21 ENCOUNTER — TELEPHONE (OUTPATIENT)
Dept: ENDOSCOPY | Facility: HOSPITAL | Age: 68
End: 2023-07-21
Payer: MEDICARE

## 2023-07-21 NOTE — TELEPHONE ENCOUNTER
Called pt to schedule Colonoscopy due to +Fit from 7/2022.  Pt states he doesn't want to schedule.  He will call if he decides to.

## 2023-07-29 ENCOUNTER — PATIENT MESSAGE (OUTPATIENT)
Dept: CARDIOLOGY | Facility: CLINIC | Age: 68
End: 2023-07-29
Payer: MEDICARE

## 2023-07-31 DIAGNOSIS — I10 ESSENTIAL HYPERTENSION: ICD-10-CM

## 2023-07-31 RX ORDER — LOSARTAN POTASSIUM 50 MG/1
50 TABLET ORAL DAILY
Qty: 90 TABLET | Refills: 3 | Status: SHIPPED | OUTPATIENT
Start: 2023-07-31

## 2023-08-03 ENCOUNTER — TELEPHONE (OUTPATIENT)
Dept: CARDIOLOGY | Facility: CLINIC | Age: 68
End: 2023-08-03
Payer: MEDICARE

## 2023-08-03 NOTE — TELEPHONE ENCOUNTER
----- Message from Chandni Domingo sent at 8/3/2023 10:46 AM CDT -----  Regarding: please call  The pt is calling about his portal messages. Please call him back @ [phone]. Thanks, Chandni

## 2023-08-18 RX ORDER — ROSUVASTATIN CALCIUM 20 MG/1
20 TABLET, COATED ORAL
Qty: 90 TABLET | Refills: 3 | Status: SHIPPED | OUTPATIENT
Start: 2023-08-18

## 2023-12-20 RX ORDER — CHLORTHALIDONE 25 MG/1
25 TABLET ORAL
Qty: 90 TABLET | Refills: 3 | Status: SHIPPED | OUTPATIENT
Start: 2023-12-20

## 2024-02-27 DIAGNOSIS — Z00.00 ENCOUNTER FOR MEDICARE ANNUAL WELLNESS EXAM: ICD-10-CM

## 2024-05-08 ENCOUNTER — LAB VISIT (OUTPATIENT)
Dept: LAB | Facility: HOSPITAL | Age: 69
End: 2024-05-08
Payer: MEDICARE

## 2024-05-08 DIAGNOSIS — E78.5 HYPERLIPIDEMIA, UNSPECIFIED: ICD-10-CM

## 2024-05-08 DIAGNOSIS — J84.112 IPF (IDIOPATHIC PULMONARY FIBROSIS): Primary | ICD-10-CM

## 2024-05-08 LAB
ALBUMIN SERPL BCP-MCNC: 4.1 G/DL (ref 3.5–5.2)
ALP SERPL-CCNC: 56 U/L (ref 38–126)
ALT SERPL W/O P-5'-P-CCNC: 27 U/L (ref 10–44)
AST SERPL-CCNC: 29 U/L (ref 15–46)
BILIRUB SERPL-MCNC: 0.6 MG/DL (ref 0.1–1)
CHOLEST SERPL-MCNC: 117 MG/DL (ref 120–199)
CHOLEST/HDLC SERPL: 2.9 {RATIO} (ref 2–5)
HDLC SERPL-MCNC: 41 MG/DL (ref 40–75)
HDLC SERPL: 35 % (ref 20–50)
LDLC SERPL CALC-MCNC: 63.2 MG/DL (ref 63–159)
NONHDLC SERPL-MCNC: 76 MG/DL
PROT SERPL-MCNC: 6.7 G/DL (ref 6–8.4)
TRIGL SERPL-MCNC: 64 MG/DL (ref 30–150)

## 2024-05-08 PROCEDURE — 80076 HEPATIC FUNCTION PANEL: CPT | Mod: PN | Performed by: NURSE PRACTITIONER

## 2024-05-08 PROCEDURE — 80061 LIPID PANEL: CPT | Performed by: NURSE PRACTITIONER

## 2024-05-08 PROCEDURE — 36415 COLL VENOUS BLD VENIPUNCTURE: CPT | Mod: PN | Performed by: NURSE PRACTITIONER

## 2024-05-13 DIAGNOSIS — I10 ESSENTIAL HYPERTENSION: ICD-10-CM

## 2024-05-13 RX ORDER — ROSUVASTATIN CALCIUM 20 MG/1
20 TABLET, COATED ORAL NIGHTLY
Qty: 90 TABLET | Refills: 3 | Status: CANCELLED | OUTPATIENT
Start: 2024-05-13

## 2024-05-13 RX ORDER — LOSARTAN POTASSIUM 50 MG/1
50 TABLET ORAL DAILY
Qty: 90 TABLET | Refills: 3 | Status: SHIPPED | OUTPATIENT
Start: 2024-05-13

## 2024-06-21 ENCOUNTER — PATIENT MESSAGE (OUTPATIENT)
Dept: ADMINISTRATIVE | Facility: OTHER | Age: 69
End: 2024-06-21
Payer: MEDICARE

## 2024-09-09 RX ORDER — ROSUVASTATIN CALCIUM 20 MG/1
20 TABLET, COATED ORAL NIGHTLY
Qty: 90 TABLET | Refills: 3 | Status: SHIPPED | OUTPATIENT
Start: 2024-09-09

## 2024-09-12 RX ORDER — CHLORTHALIDONE 25 MG/1
25 TABLET ORAL
Qty: 90 TABLET | Refills: 3 | Status: SHIPPED | OUTPATIENT
Start: 2024-09-12

## 2024-09-27 ENCOUNTER — OFFICE VISIT (OUTPATIENT)
Dept: OPTOMETRY | Facility: CLINIC | Age: 69
End: 2024-09-27
Payer: MEDICARE

## 2024-09-27 ENCOUNTER — TELEPHONE (OUTPATIENT)
Dept: OPHTHALMOLOGY | Facility: CLINIC | Age: 69
End: 2024-09-27
Payer: MEDICARE

## 2024-09-27 DIAGNOSIS — H26.492 LEFT POSTERIOR CAPSULAR OPACIFICATION: Primary | ICD-10-CM

## 2024-09-27 DIAGNOSIS — D31.31 NEVUS OF CHOROID OF RIGHT EYE: ICD-10-CM

## 2024-09-27 PROCEDURE — 99999 PR PBB SHADOW E&M-EST. PATIENT-LVL III: CPT | Mod: PBBFAC,,, | Performed by: OPTOMETRIST

## 2024-09-27 PROCEDURE — 99213 OFFICE O/P EST LOW 20 MIN: CPT | Mod: PBBFAC,PO | Performed by: OPTOMETRIST

## 2024-09-27 NOTE — PROGRESS NOTES
HPI     Eye Problem     Additional comments: Blur os at dist, x mos, no assoc pain or red, no   relief over time, constant           Comments    Ocular health visit CAROLINE 12/16/20    Pt complains of blurred va OS mainly. Pt denies flashes, floaters and   gtts. Pt using otc readers to aid near but would like a prescription if   needed.           Last edited by Harvey Gayle, OD on 9/27/2024  9:19 AM.            Assessment /Plan     For exam results, see Encounter Report.    Left posterior capsular opacification    Nevus of choroid of right eye      Refer to Vicky for yag eval os  Monitor condition. Patient to report any changes. RTC 1 year recheck.

## 2024-11-18 ENCOUNTER — PATIENT MESSAGE (OUTPATIENT)
Dept: OPTOMETRY | Facility: CLINIC | Age: 69
End: 2024-11-18
Payer: MEDICARE

## 2024-11-18 DIAGNOSIS — H00.15 CHALAZION OF LEFT LOWER EYELID: Primary | ICD-10-CM

## 2024-11-18 RX ORDER — NEOMYCIN SULFATE, POLYMYXIN B SULFATE AND DEXAMETHASONE 3.5; 10000; 1 MG/ML; [USP'U]/ML; MG/ML
1 SUSPENSION/ DROPS OPHTHALMIC 4 TIMES DAILY
Qty: 5 ML | Refills: 0 | Status: SHIPPED | OUTPATIENT
Start: 2024-11-18 | End: 2024-11-28

## 2024-11-18 NOTE — PROGRESS NOTES
Assessment /Plan     For exam results, see Encounter Report.    Chalazion of left lower eyelid  -     neomycin-polymyxin-dexamethasone (MAXITROL) 3.5mg/mL-10,000 unit/mL-0.1 % DrpS; Place 1 drop into the left eye 4 (four) times daily. for 10 days  Dispense: 5 mL; Refill: 0      Sent in drops for chalazion per pt request, cont warm compress, RTC or clal after 1 week

## 2024-12-04 ENCOUNTER — PROCEDURE VISIT (OUTPATIENT)
Dept: OPHTHALMOLOGY | Facility: CLINIC | Age: 69
End: 2024-12-04
Payer: MEDICARE

## 2024-12-04 DIAGNOSIS — Z96.1 PSEUDOPHAKIA: ICD-10-CM

## 2024-12-04 DIAGNOSIS — H43.813 VITREOUS DETACHMENT OF BOTH EYES: ICD-10-CM

## 2024-12-04 DIAGNOSIS — H26.493 PCO (POSTERIOR CAPSULAR OPACIFICATION), BILATERAL: Primary | ICD-10-CM

## 2024-12-04 DIAGNOSIS — H02.402 PTOSIS OF LEFT EYELID: ICD-10-CM

## 2024-12-04 PROCEDURE — 66821 AFTER CATARACT LASER SURGERY: CPT | Mod: 50,S$PBB,, | Performed by: OPHTHALMOLOGY

## 2024-12-04 PROCEDURE — 66821 AFTER CATARACT LASER SURGERY: CPT | Mod: PBBFAC | Performed by: OPHTHALMOLOGY

## 2024-12-04 PROCEDURE — 92004 COMPRE OPH EXAM NEW PT 1/>: CPT | Mod: 57,S$PBB,, | Performed by: OPHTHALMOLOGY

## 2024-12-04 NOTE — PROGRESS NOTES
Subjective:       Patient ID: Esteban Carson is a 69 y.o. male.    Chief Complaint: PCO    HPI    DLS: 9/27/2024 With Dr. Gayle    Pt here for YAG CAP OS per Dr. Gayle;  Pt states vision in OU has been blurry lately. Pt c/o chalazion on LLL was   given some eye drops Maxitrol to use and seems to be getting better.     Meds;  Maxitrol TID OS      Last edited by Filomena Mccoy on 12/4/2024  2:57 PM.             Assessment:       1. PCO (posterior capsular opacification), bilateral    2. Ptosis of left eyelid    3. Vitreous detachment of both eyes    4. Pseudophakia        Plan:       Visually significant  PCO OS>OD- Pt. Wants Lasers.    ANGÉLICA ptosis-Stable.  PVD's OU-Stable.      YAG CAP right eye (OD) today. TE=   134 mj, Avg. 2.0 mj, 67 pulses.  YAG CAP left eye (OS) today. TE=   67 mj, Avg. 2.2 mj, 36 pulses.  PF taper OU.  RTC 12/18/24.    YAG laser Capsulotomy Procedures Note:  Informed consent was obtained.   One drop of topical Proparacaine 1% was instilled OU.  YAG laser applied to posterior capsule in cruciate pattern OU.  One drop of Apraclonidine 0.5% and 1 drop of Pred Acetate 1% applied to each eye after lasers.  Pt tolerated procedures well. No complications.  Follow up in 2-3 weeks.

## 2024-12-18 ENCOUNTER — OFFICE VISIT (OUTPATIENT)
Dept: OPHTHALMOLOGY | Facility: CLINIC | Age: 69
End: 2024-12-18
Payer: MEDICARE

## 2024-12-18 DIAGNOSIS — Z96.1 PSEUDOPHAKIA: ICD-10-CM

## 2024-12-18 DIAGNOSIS — Z98.890 POST-OPERATIVE STATE: Primary | ICD-10-CM

## 2024-12-18 PROCEDURE — 99213 OFFICE O/P EST LOW 20 MIN: CPT | Mod: PBBFAC | Performed by: OPHTHALMOLOGY

## 2024-12-18 PROCEDURE — 99999 PR PBB SHADOW E&M-EST. PATIENT-LVL III: CPT | Mod: PBBFAC,,, | Performed by: OPHTHALMOLOGY

## 2024-12-18 PROCEDURE — 99024 POSTOP FOLLOW-UP VISIT: CPT | Mod: POP,,, | Performed by: OPHTHALMOLOGY

## 2024-12-18 NOTE — PROGRESS NOTES
Subjective:       Patient ID: Esteban Carson is a 69 y.o. male.    Chief Complaint: YAG follow up  12/04/24  ou  (Yag   ou /Follow up /Pt is pleased  with va ou   using OTC  +2.50 for smaller print /Hx  ptosis  os ///Gtts maxitrol  tid os )    HPI     YAG follow up  12/04/24  ou      Additional comments: Yag   ou   Follow up   Pt is pleased  with va ou   using OTC  +2.50 for smaller print   Hx  ptosis  os       Gtts maxitrol  tid os           Last edited by Bertha Weiner on 12/18/2024  1:42 PM.             Assessment:       1. Post-operative state    2. Pseudophakia        Plan:       S/p YAG CAP OU-Doing well.          RTC Dr Gayle in 6 mos.

## 2025-05-24 DIAGNOSIS — I10 ESSENTIAL HYPERTENSION: ICD-10-CM

## 2025-05-26 RX ORDER — LOSARTAN POTASSIUM 50 MG/1
50 TABLET ORAL
Qty: 90 TABLET | Refills: 3 | Status: SHIPPED | OUTPATIENT
Start: 2025-05-26

## 2025-05-26 NOTE — TELEPHONE ENCOUNTER
Refill Routing Note   Medication(s) are not appropriate for processing by [facility] for the following reason(s):        Patient not seen by provider within 15 months  Required labs outdated  Required vitals outdated    [facility] action(s):  Defer               Appointments  past 12m or future 3m with PCP    Date Provider   Last Visit   11/2/2022 Royal Delacruz III, MD   Next Visit   Visit date not found Royal Delacruz III, MD   ED visits in past 90 days: 0        Note composed:8:49 PM 05/25/2025

## 2025-06-29 NOTE — TELEPHONE ENCOUNTER
Refill Routing Note   Medication(s) are not appropriate for processing by [facility] for the following reason(s):        Patient not seen by provider within 15 months  Required vitals outdated  Required labs outdated    [facility] action(s):  Defer               Appointments  past 12m or future 3m with PCP    Date Provider   Last Visit   11/2/2022 Royal Delacruz III, MD   Next Visit   Visit date not found Royal Delacruz III, MD   ED visits in past 90 days: 0        Note composed:8:45 AM 06/29/2025

## 2025-07-02 RX ORDER — CHLORTHALIDONE 25 MG/1
25 TABLET ORAL
Qty: 90 TABLET | Refills: 3 | OUTPATIENT
Start: 2025-07-02

## 2025-07-02 RX ORDER — CHLORTHALIDONE 25 MG/1
25 TABLET ORAL DAILY
Qty: 90 TABLET | Refills: 3 | Status: SHIPPED | OUTPATIENT
Start: 2025-07-02

## 2025-07-02 RX ORDER — ROSUVASTATIN CALCIUM 20 MG/1
20 TABLET, COATED ORAL NIGHTLY
Qty: 90 TABLET | Refills: 3 | Status: SHIPPED | OUTPATIENT
Start: 2025-07-02

## (undated) DEVICE — DRAPE STERI 32 X 50

## (undated) DEVICE — SOLUTION BSS PLUS

## (undated) DEVICE — NDL FLTR 5MCRN BLNT TIP 18GX1

## (undated) DEVICE — SOL BETADINE 5%

## (undated) DEVICE — STRIP MG FML-GLO .06 - ORDER F

## (undated) DEVICE — HYDRODISSECTOR NUCLEUS 27GX7/8

## (undated) DEVICE — SHEILD & GARTERS FOX METAL EYE

## (undated) DEVICE — SOL WATER STRL IRR 1000ML

## (undated) DEVICE — Device

## (undated) DEVICE — KIT GREY EYE

## (undated) DEVICE — GOWN SURGICAL X-LARGE